# Patient Record
Sex: FEMALE
[De-identification: names, ages, dates, MRNs, and addresses within clinical notes are randomized per-mention and may not be internally consistent; named-entity substitution may affect disease eponyms.]

---
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
## 2017-01-24 NOTE — ED NOTES
Capri from [facility] at bedside for assessment. Pt remains calm and cooperative.

## 2017-01-24 NOTE — ED NOTES
Pt discharged to home with steady gait. No distress.  Pt states \"I have a car waiting for me from my daughter\"

## 2017-01-24 NOTE — ED NOTES
Cherelle Reyes (son) [phone]. He is the family member that brought in the patient, can call at any time, if you need to leave a message, he will call back ASAP.

## 2017-01-24 NOTE — ED NOTES
Pt safe for discharge per [facility]., Pt given phone to contact son for a ride home and all belongings. Pt calm and cooperative.
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
## 2017-01-24 NOTE — BH LEVEL OF CARE ASSESSMENT
Comprehensive Assessment Note  General Questions   Why are you here?: Pt stated that she has had some depression and anxiety. She reported she takes welbutrin for it which is prescribed by her PCP.  She said that recently her depression and anxiety have bee Describe Current/Recent Suicidal Ideation: Pt stated she has had passive, hopless thoughts but denies anything in the past couple of days at least. She stated that she made the statement today because she was frustrated, not because she was actually havi stated that it has been under control but did start getting a little worse again about 3 years ago when he son moved back in and she started drinking again. Anxiety Symptoms: Generalized;Panic attack; Chest discomfort;Palpitations; Shortness of breath   Pa hang-over?: No   Screening Score: 5   Tobacco Use: Never smoked/never used tobacco product   Caffeine (include beverages/tablets) Use: Yes   Amount/Frequency: Pt stated she does drink caffine   Last Use: Unknown   Used substances (other than as prescribed) Partner/Caregiver?: No   Health Concerns r/t Abuse: No   Possible Abuse Reportable to[de-identified] Not appropriate for reporting to authorities   Mental Status   Appearance Characteristics: Good hygiene; Appropriate clothing   Mood: Angry; Hopeless;Irritable   Affect:

## 2017-01-24 NOTE — ED PROVIDER NOTES
Patient re-evaluated at 0. She denies SI.HI. She was evaluated by [place] and they are comfortable with discharge.

## 2017-01-24 NOTE — ED PROVIDER NOTES
Patient Seen in: [facility] Emergency Department    History   Patient presents with:  Eval-P (psychiatric)    Stated Complaint: SI    HPI    this is a 57-year-old coming with complaints of alcohol abuse.   Patient has a history of intermittent alcoho Cancer Father      lung   • Other Father      COPD   • Diabetes Mother    • Heart Disorder Mother    • Psychiatric Mother      alzheimers   • Cancer Maternal Grandmother      colon CA   • Cancer Paternal Grandfather      colon CA   • Heart Disorder Materna tenderness noted  Extremity exam shows no clubbing cyanosis or edema  Skin exam shows no rashes or lacerations  Neuro exam shows no focal deficits  Back exam shows no tenderness    ED Course     Labs Reviewed   BASIC METABOLIC PANEL (8) - Abnormal; Notable

## 2017-08-30 PROBLEM — F41.9 ANXIETY: Status: ACTIVE | Noted: 2017-08-30

## 2017-08-30 PROBLEM — I34.1 MVP (MITRAL VALVE PROLAPSE): Status: ACTIVE | Noted: 2017-08-30

## 2017-11-06 PROBLEM — K76.0 FATTY LIVER: Status: ACTIVE | Noted: 2017-11-06

## 2017-11-07 PROBLEM — M25.561 ACUTE PAIN OF RIGHT KNEE: Status: ACTIVE | Noted: 2017-11-07

## 2017-11-07 PROBLEM — M25.562 LEFT KNEE PAIN: Status: ACTIVE | Noted: 2017-11-07

## 2017-11-07 PROCEDURE — 87338 HPYLORI STOOL AG IA: CPT | Performed by: INTERNAL MEDICINE

## 2017-11-13 PROBLEM — M25.562 LEFT KNEE PAIN: Status: RESOLVED | Noted: 2017-11-07 | Resolved: 2017-11-13

## 2018-10-05 ENCOUNTER — HOSPITAL (OUTPATIENT)
Dept: OTHER | Age: 58
End: 2018-10-05
Attending: EMERGENCY MEDICINE

## 2018-10-05 LAB
ANALYZER ANC (IANC): NORMAL
ANION GAP SERPL CALC-SCNC: 12 MMOL/L (ref 10–20)
BASOPHILS # BLD: 0 THOUSAND/MCL (ref 0–0.3)
BASOPHILS NFR BLD: 0 %
BUN SERPL-MCNC: 14 MG/DL (ref 6–20)
BUN/CREAT SERPL: 19 (ref 7–25)
CALCIUM SERPL-MCNC: 9.2 MG/DL (ref 8.4–10.2)
CHLORIDE: 105 MMOL/L (ref 98–107)
CO2 SERPL-SCNC: 30 MMOL/L (ref 21–32)
CREAT SERPL-MCNC: 0.75 MG/DL (ref 0.51–0.95)
DIFFERENTIAL METHOD BLD: NORMAL
EOSINOPHIL # BLD: 0.1 THOUSAND/MCL (ref 0.1–0.5)
EOSINOPHIL NFR BLD: 2 %
ERYTHROCYTE [DISTWIDTH] IN BLOOD: 13 % (ref 11–15)
GLUCOSE SERPL-MCNC: 97 MG/DL (ref 65–99)
HEMATOCRIT: 42 % (ref 36–46.5)
HGB BLD-MCNC: 14.1 GM/DL (ref 12–15.5)
LYMPHOCYTES # BLD: 1.5 THOUSAND/MCL (ref 1–4)
LYMPHOCYTES NFR BLD: 30 %
MCH RBC QN AUTO: 33.3 PG (ref 26–34)
MCHC RBC AUTO-ENTMCNC: 33.6 GM/DL (ref 32–36.5)
MCV RBC AUTO: 99.1 FL (ref 78–100)
MONOCYTES # BLD: 0.5 THOUSAND/MCL (ref 0.3–0.9)
MONOCYTES NFR BLD: 9 %
NEUTROPHILS # BLD: 3 THOUSAND/MCL (ref 1.8–7.7)
NEUTROPHILS NFR BLD: 59 %
NEUTS SEG NFR BLD: NORMAL %
NRBC (NRBCRE): NORMAL
PLATELET # BLD: 246 THOUSAND/MCL (ref 140–450)
POTASSIUM SERPL-SCNC: 3.6 MMOL/L (ref 3.4–5.1)
RBC # BLD: 4.24 MILLION/MCL (ref 4–5.2)
SODIUM SERPL-SCNC: 143 MMOL/L (ref 135–145)
WBC # BLD: 5.1 THOUSAND/MCL (ref 4.2–11)

## 2018-10-07 ENCOUNTER — HOSPITAL ENCOUNTER (EMERGENCY)
Facility: HOSPITAL | Age: 58
Discharge: HOME | End: 2018-10-07
Attending: EMERGENCY MEDICINE
Payer: COMMERCIAL

## 2018-10-07 VITALS
HEART RATE: 75 BPM | DIASTOLIC BLOOD PRESSURE: 74 MMHG | SYSTOLIC BLOOD PRESSURE: 118 MMHG | BODY MASS INDEX: 24.91 KG/M2 | OXYGEN SATURATION: 98 % | TEMPERATURE: 98 F | RESPIRATION RATE: 17 BRPM | WEIGHT: 155 LBS | HEIGHT: 66 IN

## 2018-10-07 DIAGNOSIS — G43.109 OCULAR MIGRAINE: Primary | ICD-10-CM

## 2018-10-07 PROCEDURE — 99283 EMERGENCY DEPT VISIT LOW MDM: CPT

## 2018-10-07 RX ORDER — SUMATRIPTAN 50 MG/1
50 TABLET, FILM COATED ORAL EVERY 2 HOUR PRN
Qty: 6 TABLET | Refills: 0 | Status: SHIPPED | OUTPATIENT
Start: 2018-10-07 | End: 2018-10-07

## 2018-10-07 RX ORDER — BUTALBITAL, ACETAMINOPHEN AND CAFFEINE 50; 325; 40 MG/1; MG/1; MG/1
1-2 TABLET ORAL EVERY 4 HOURS PRN
Qty: 10 TABLET | Refills: 0 | Status: SHIPPED | OUTPATIENT
Start: 2018-10-07 | End: 2018-10-14

## 2018-10-07 NOTE — ED NOTES
Faxed over release of medical information over to [facility] for CT scan results performed a few days ago.

## 2018-10-07 NOTE — ED INITIAL ASSESSMENT (HPI)
Patient see had headache four days went to PMD was sent to the [facility] performed CT scan which was normal. Instructed to follow up with neurologist this week.  Patient see yesterday was at work and had same occular migraine flashing over left eye, and

## 2018-10-07 NOTE — ED NOTES
Faxed over information request for a second time. [facility] nursing supervisor called back and didn't have dates requested.

## 2018-10-08 NOTE — ED PROVIDER NOTES
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   Patient states previously she  had unoff [address] [place] [address]  7/19/2012: COLONOSCOPY,DIAGNOSTIC      Comment:  Procedure: COLONOSCOPY, POSSIBLE BIOPSY, POSSIBLE                POLYPECTOMY 80507;  Surgeon: Paul Heaton MD;                 Location: [address] [place] [address] tenderness. ABDOMEN: + Bowel sounds, soft, nontender, nondistended. No rebound, no guarding, no hepatosplenomegaly. EXTREMITIES: Full range of motion, no tenderness, good capillary refill. SKIN: No rash, good turgor.   NEURO: Patient answers questions a

## 2018-11-02 NOTE — PROGRESS NOTES
HPI:   Patient presents with:  Establish Care  Lump: enlarged poss thyroid, h/o nodules  Anxiety      Linnell Severin is a 59year old female who presents for anxiety and depression    Mood is same .   She complains of:  feeling depression, anhedonia, anx • Panic attacks         Flagyl [Metronidazo*    DIARRHEA, NAUSEA AND VOMITING    Comment:GI INTOLERANCE  Tylox [Oxycodone-Ac*    RASH  Opioid Analgesics       NAUSEA AND VOMITING  Influenza Vaccines      OTHER (SEE COMMENTS)    Comment:Numbness extremiti associated skin changes or lesions  LUNGS: good BS B, clear to auscultation B, no wheezing, no rales, no rhonchi B   CARDIO: RRR without murmur, NL S1 S2, no S3 S4  EXT: no CCE, Brachial, PT pulses wnl B  GI: NABS  NEURO: A&O x 3, motor and sensory grossly Visit:  Requested Prescriptions      No prescriptions requested or ordered in this encounter       Imaging & Consults:  None    Marga Olson MD

## 2018-12-06 ENCOUNTER — TELEPHONE (OUTPATIENT)
Dept: FAMILY MEDICINE CLINIC | Facility: CLINIC | Age: 58
End: 2018-12-06

## 2018-12-06 ENCOUNTER — OFFICE VISIT (OUTPATIENT)
Dept: FAMILY MEDICINE CLINIC | Facility: CLINIC | Age: 58
End: 2018-12-06
Payer: COMMERCIAL

## 2018-12-06 VITALS
SYSTOLIC BLOOD PRESSURE: 112 MMHG | HEART RATE: 76 BPM | TEMPERATURE: 99 F | BODY MASS INDEX: 26.01 KG/M2 | WEIGHT: 158 LBS | HEIGHT: 65.5 IN | DIASTOLIC BLOOD PRESSURE: 80 MMHG | OXYGEN SATURATION: 97 % | RESPIRATION RATE: 16 BRPM

## 2018-12-06 DIAGNOSIS — R06.2 WHEEZING: ICD-10-CM

## 2018-12-06 DIAGNOSIS — R05.9 COUGH: ICD-10-CM

## 2018-12-06 DIAGNOSIS — J20.9 ACUTE BRONCHITIS, UNSPECIFIED ORGANISM: Primary | ICD-10-CM

## 2018-12-06 PROCEDURE — 94640 AIRWAY INHALATION TREATMENT: CPT | Performed by: FAMILY MEDICINE

## 2018-12-06 PROCEDURE — 99214 OFFICE O/P EST MOD 30 MIN: CPT | Performed by: FAMILY MEDICINE

## 2018-12-06 RX ORDER — AZITHROMYCIN 250 MG/1
TABLET, FILM COATED ORAL
Qty: 6 TABLET | Refills: 0 | Status: CANCELLED | OUTPATIENT
Start: 2018-12-06

## 2018-12-06 RX ORDER — ALBUTEROL SULFATE 2.5 MG/3ML
2.5 SOLUTION RESPIRATORY (INHALATION) ONCE
Status: COMPLETED | OUTPATIENT
Start: 2018-12-06 | End: 2018-12-06

## 2018-12-06 RX ORDER — BENZONATATE 100 MG/1
100 CAPSULE ORAL 3 TIMES DAILY PRN
Qty: 30 CAPSULE | Refills: 0 | Status: SHIPPED | OUTPATIENT
Start: 2018-12-06 | End: 2019-04-26 | Stop reason: ALTCHOICE

## 2018-12-06 RX ORDER — CODEINE PHOSPHATE AND GUAIFENESIN 10; 100 MG/5ML; MG/5ML
5 SOLUTION ORAL NIGHTLY PRN
Qty: 236 ML | Refills: 0 | Status: CANCELLED
Start: 2018-12-06

## 2018-12-06 RX ORDER — PREDNISONE 20 MG/1
20 TABLET ORAL SEE ADMIN INSTRUCTIONS
Qty: 18 TABLET | Refills: 0 | Status: SHIPPED | OUTPATIENT
Start: 2018-12-06 | End: 2019-04-26 | Stop reason: ALTCHOICE

## 2018-12-06 RX ORDER — ALBUTEROL SULFATE 90 UG/1
2 AEROSOL, METERED RESPIRATORY (INHALATION) EVERY 6 HOURS PRN
Qty: 1 INHALER | Refills: 2 | COMMUNITY
Start: 2018-12-06 | End: 2019-04-26 | Stop reason: ALTCHOICE

## 2018-12-06 RX ADMIN — ALBUTEROL SULFATE 2.5 MG: 2.5 SOLUTION RESPIRATORY (INHALATION) at 14:46:00

## 2018-12-06 NOTE — TELEPHONE ENCOUNTER
Pt cld she was in [address] [place] [address] yesterday in [facility] with high fever. They diagnosed her with flu. Today she has yellow coming out of her spetum(throat) so she feels she needs an antiobiotic. Told her I would send message along to nurse they would call h

## 2018-12-06 NOTE — PROGRESS NOTES
HPI: 59year old female presents for 1 day IC f/u on cough. VSS. Flu swab resulted negative per patient. Prescribed Tamiflu but didn't start it; has Alb INH from IC. Cough is dry. (+) assoc wheezing. Denies fevers/chills/sinus pressure/PND/CP/SOB/abd pain. edema.  Skin: Skin color, texture, turgor normal. No rashes or lesions. Lymph nodes: Cervical, supraclavicular nodes normal.    IMPRESSION:  1.  Acute Bronchitis    Patient Active Problem List:     Goiter     MVP (mitral valve prolapse)     Anxiety     Fat

## 2018-12-06 NOTE — TELEPHONE ENCOUNTER
Called patient; She states on Monday she started getting a sore throat, then on Wednesday morning started having fever/chills, clear sputum. Patient had fever yesterday evening, does not have a fever now.   Patient states her children had bronchitis recen

## 2018-12-06 NOTE — PATIENT INSTRUCTIONS
PLAN:  -Neb in the office with significant improvement  -Prednisone taper (no NSAIDs such as Ibuprofen/Advil/Aleve while taking)  -continue Albuterol INH every 4-6 hours as needed  -Tessalon as needed  -OTC Cepacol Cough drops as needed  -f/u as needed

## 2019-04-26 ENCOUNTER — HOSPITAL ENCOUNTER (OUTPATIENT)
Dept: BONE DENSITY | Age: 59
Discharge: HOME | End: 2019-04-26
Attending: FAMILY MEDICINE
Payer: COMMERCIAL

## 2019-04-26 ENCOUNTER — HOSPITAL ENCOUNTER (OUTPATIENT)
Dept: MAMMOGRAPHY | Age: 59
Discharge: HOME | End: 2019-04-26
Attending: FAMILY MEDICINE
Payer: COMMERCIAL

## 2019-04-26 ENCOUNTER — IMAGING SERVICES (OUTPATIENT)
Dept: OTHER | Age: 59
End: 2019-04-26
Attending: INTERNAL MEDICINE

## 2019-04-26 ENCOUNTER — HOSPITAL ENCOUNTER (OUTPATIENT)
Dept: GENERAL RADIOLOGY | Age: 59
Discharge: HOME | End: 2019-04-26
Attending: FAMILY MEDICINE
Payer: COMMERCIAL

## 2019-04-26 ENCOUNTER — OFFICE VISIT (OUTPATIENT)
Dept: FAMILY MEDICINE CLINIC | Facility: CLINIC | Age: 59
End: 2019-04-26
Payer: COMMERCIAL

## 2019-04-26 VITALS
SYSTOLIC BLOOD PRESSURE: 120 MMHG | TEMPERATURE: 98 F | DIASTOLIC BLOOD PRESSURE: 78 MMHG | RESPIRATION RATE: 17 BRPM | OXYGEN SATURATION: 98 % | BODY MASS INDEX: 25.88 KG/M2 | HEART RATE: 81 BPM | WEIGHT: 161 LBS | HEIGHT: 66 IN

## 2019-04-26 DIAGNOSIS — Z12.31 SCREENING MAMMOGRAM, ENCOUNTER FOR: ICD-10-CM

## 2019-04-26 DIAGNOSIS — F17.200 TOBACCO DEPENDENCE: ICD-10-CM

## 2019-04-26 DIAGNOSIS — R05.9 COUGH: ICD-10-CM

## 2019-04-26 DIAGNOSIS — F41.1 GENERALIZED ANXIETY DISORDER: ICD-10-CM
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
## 2019-04-26 DIAGNOSIS — Z12.11 SCREENING FOR COLON CANCER: ICD-10-CM

## 2019-04-26 DIAGNOSIS — Z13.820 SCREENING FOR OSTEOPOROSIS: ICD-10-CM

## 2019-04-26 DIAGNOSIS — F32.89 OTHER DEPRESSION: ICD-10-CM

## 2019-04-26 DIAGNOSIS — K76.0 FATTY LIVER: ICD-10-CM

## 2019-04-26 DIAGNOSIS — I83.893 VARICOSE VEINS OF BILATERAL LOWER EXTREMITIES WITH OTHER COMPLICATIONS: ICD-10-CM

## 2019-04-26 DIAGNOSIS — S96.911A: ICD-10-CM

## 2019-04-26 DIAGNOSIS — Z00.00 ROUTINE MEDICAL EXAM: Primary | ICD-10-CM

## 2019-04-26 PROBLEM — F32.A DEPRESSION: Status: ACTIVE | Noted: 2019-04-26

## 2019-04-26 PROCEDURE — 77063 BREAST TOMOSYNTHESIS BI: CPT | Performed by: FAMILY MEDICINE

## 2019-04-26 PROCEDURE — 77080 DXA BONE DENSITY AXIAL: CPT | Performed by: FAMILY MEDICINE

## 2019-04-26 PROCEDURE — 90471 IMMUNIZATION ADMIN: CPT | Performed by: FAMILY MEDICINE

## 2019-04-26 PROCEDURE — 99396 PREV VISIT EST AGE 40-64: CPT | Performed by: FAMILY MEDICINE

## 2019-04-26 PROCEDURE — 90715 TDAP VACCINE 7 YRS/> IM: CPT | Performed by: FAMILY MEDICINE

## 2019-04-26 PROCEDURE — 71046 X-RAY EXAM CHEST 2 VIEWS: CPT | Performed by: FAMILY MEDICINE

## 2019-04-26 PROCEDURE — 99213 OFFICE O/P EST LOW 20 MIN: CPT | Performed by: FAMILY MEDICINE

## 2019-04-26 PROCEDURE — 96127 BRIEF EMOTIONAL/BEHAV ASSMT: CPT | Performed by: FAMILY MEDICINE

## 2019-04-26 PROCEDURE — 77067 SCR MAMMO BI INCL CAD: CPT | Performed by: FAMILY MEDICINE

## 2019-04-26 RX ORDER — DULOXETIN HYDROCHLORIDE 30 MG/1
30 CAPSULE, DELAYED RELEASE ORAL DAILY
Qty: 30 CAPSULE | Refills: 1 | Status: SHIPPED | OUTPATIENT
Start: 2019-04-26 | End: 2019-06-04

## 2019-04-26 NOTE — H&P
HPI:   Patient presents with:  Physical  Anxiety  Depression  Musculoskeletal Problem  Nicotine Dependence      Hiro Mayer is a 59year old female who presents for a complete physical exam without pap/gyne exam.     Patient has new complaints of:  · left foot 3/8/2016   • Cerebral aneurysm without rupture 12/2010   • Cerebral aneurysm without rupture    • DEPRESSION    • Ectopic pregnancy x2    x2   • Mitral valve prolapse    • Panic attacks       Past Surgical History:   Procedure Laterality Date   • a week        Comment: recovering alcoholism, last drink 2007      Drug use: No      Sexual activity: Not Currently        Partners: Male    Other Topics      Concerns:         Service: No        Blood Transfusions: Yes          with ectopic 1981 discharge or retraction B, no skin lesions B, no axillary LAD B, no tenderness B  EXTREMITIES: no CCE,  brachial, DP, PT pulses wnl B  MS: no significant joint or bony deformities B UE and LE, FROM of B UE and LE and back  PSYCH: mood and affect WNL, speec (CYMBALTA) 30 MG Oral Cap DR Particles; Take 1 capsule (30 mg total) by mouth daily. Dispense: 30 capsule; Refill: 1    6. Fatty liver  ***  - COMP METABOLIC PANEL (14); Future    7.  Generalized anxiety disorder  ***  - DULoxetine HCl (CYMBALTA) 30 MG Ora

## 2019-04-26 NOTE — H&P
HPI:   Patient presents with:  Physical  Anxiety  Depression  Musculoskeletal Problem  Nicotine Dependence      Noel Zavala is a 59year old female who presents for a complete physical exam without pap/gyne exam.     Patient has new complaints of:  A  11/06/2017 02:32 PM    CO2 24.1 11/06/2017 02:32 PM    CREATSERUM 0.88 11/06/2017 02:32 PM    CA 9.8 11/06/2017 02:32 PM    ALB 4.2 11/06/2017 02:32 PM    TP 7.5 11/06/2017 02:32 PM    ALKPHO 78 11/06/2017 02:32 PM    AST 22 11/06/2017 02:32 PM CA   • Cancer Paternal Grandfather         colon CA   • Heart Disorder Maternal Grandfather         MI   • Cancer Sister         thyroid   • Cancer Brother         GB       Flagyl [Metronidazo*    DIARRHEA, NAUSEA AND VOMITING    Comment:GI INTOLERANCE  Ty Denies numbness or tingling or weakness  PSYCH:  No mood concerns, no chronic sleep difficulties    EXAM:   /78 (BP Location: Right arm)   Pulse 81   Temp 97.8 °F (36.6 °C) (Oral)   Resp 17   Ht 66\"   Wt 161 lb   SpO2 98%   BMI 25.99 kg/m²  Estimate exercise 30 minutes 3-4 times weekly. Health maintenance:   · PAP q 2-3 years if WNL through 66-72 y/o, pt declined  · Annual B screening Mammogram: due now   · Dexascan:  Will check now.   · Recommend dietary calcium and Vit D (for most women supplement (CPT=71046); Future    10. Varicose veins of bilateral lower extremities with other complications  rec sx txt, consider vascular surg consultation if sx worsen    11.  Repetitive strain injury of right ankle  rec PT eval and txt, f/u w me prn  - OP REFERRAL

## 2019-05-07 ENCOUNTER — OFFICE VISIT (OUTPATIENT)
Dept: FAMILY MEDICINE CLINIC | Facility: CLINIC | Age: 59
End: 2019-05-07
Payer: COMMERCIAL

## 2019-05-07 VITALS
HEIGHT: 66 IN | TEMPERATURE: 98 F | SYSTOLIC BLOOD PRESSURE: 120 MMHG | RESPIRATION RATE: 16 BRPM | BODY MASS INDEX: 26.2 KG/M2 | WEIGHT: 163 LBS | DIASTOLIC BLOOD PRESSURE: 80 MMHG | HEART RATE: 70 BPM

## 2019-05-07 DIAGNOSIS — R09.81 NASAL CONGESTION: ICD-10-CM

## 2019-05-07 DIAGNOSIS — R05.9 COUGH: Primary | ICD-10-CM

## 2019-05-07 PROCEDURE — 99214 OFFICE O/P EST MOD 30 MIN: CPT | Performed by: FAMILY MEDICINE

## 2019-05-07 RX ORDER — CEFUROXIME AXETIL 500 MG/1
500 TABLET ORAL 2 TIMES DAILY
Qty: 20 TABLET | Refills: 0 | Status: SHIPPED | OUTPATIENT
Start: 2019-05-07 | End: 2019-05-17

## 2019-05-07 NOTE — PATIENT INSTRUCTIONS
PLAN:  -Ceftin abx x 7 days  -stay well-hydrated  -steam showers or baths  -hot tea with honey  -OTC Robitussin Cough Syrup nightly as needed  -OTC Tylenol/Ibuprofen as needed  -note for work provided  -f/u as needed

## 2019-05-07 NOTE — PROGRESS NOTES
HPI: 59year old female presents c/o Patient presents with:  Cough: x4days    Nasal Congestion    VSS. Cough is productive-yellow. VSS. (+) assoc nasal congestion. . 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  Tolerating PO fluids/solids well without any di cyanosis or edema. Skin: Skin color, texture, turgor normal. No rashes or lesions. Lymph nodes: Cervical nodes normal.    Over 50% of this 25 minute visit was spent on counseling and coordination of care. IMPRESSION:  1.  Cough/Nasal Congestion - patie

## 2019-05-31 ENCOUNTER — LAB ENCOUNTER (OUTPATIENT)
Dept: LAB | Age: 59
End: 2019-05-31
Attending: FAMILY MEDICINE
Payer: COMMERCIAL

## 2019-05-31 DIAGNOSIS — K76.0 FATTY LIVER: ICD-10-CM

## 2019-05-31 DIAGNOSIS — L65.9 HAIR LOSS: ICD-10-CM

## 2019-05-31 DIAGNOSIS — Z00.00 ROUTINE MEDICAL EXAM: ICD-10-CM

## 2019-05-31 PROCEDURE — 36415 COLL VENOUS BLD VENIPUNCTURE: CPT

## 2019-05-31 PROCEDURE — 80053 COMPREHEN METABOLIC PANEL: CPT

## 2019-05-31 PROCEDURE — 85025 COMPLETE CBC W/AUTO DIFF WBC: CPT

## 2019-05-31 PROCEDURE — 83540 ASSAY OF IRON: CPT

## 2019-05-31 PROCEDURE — 83002 ASSAY OF GONADOTROPIN (LH): CPT

## 2019-05-31 PROCEDURE — 82306 VITAMIN D 25 HYDROXY: CPT

## 2019-05-31 PROCEDURE — 83001 ASSAY OF GONADOTROPIN (FSH): CPT

## 2019-05-31 PROCEDURE — 82728 ASSAY OF FERRITIN: CPT

## 2019-05-31 PROCEDURE — 84403 ASSAY OF TOTAL TESTOSTERONE: CPT

## 2019-05-31 PROCEDURE — 86038 ANTINUCLEAR ANTIBODIES: CPT

## 2019-05-31 PROCEDURE — 84480 ASSAY TRIIODOTHYRONINE (T3): CPT

## 2019-05-31 PROCEDURE — 80061 LIPID PANEL: CPT

## 2019-05-31 PROCEDURE — 84443 ASSAY THYROID STIM HORMONE: CPT

## 2019-06-04 NOTE — PROGRESS NOTES
HPI:   Patient presents with: Anxiety  Depression  Lab Results  Hemorrhoids  Héctor Devries is a 59year old female who presents for anxiety and depression    Mood is better somewhat.   She complains of:  feeling anxious, depressed mood and Smoking status: Former Smoker        Packs/day: 1.00        Years: 30.00        Pack years: 30        Types: Cigarettes        Quit date: 2019        Years since quittin.0      Smokeless tobacco: Never Used      Tobacco comment: 3-5 cig a day    Al well groomed and neatly dressed  Gait and Station:  normal  Attitude: cooperative and consistent  Speech: normal rate, rhythm and volume    ASSESSMENT AND PLAN:   1.  Patient is a 59year old female who is here for anxiety and depression, denies RANJITH     Pt Magdiel Rivas MD

## 2019-07-02 DIAGNOSIS — F32.89 OTHER DEPRESSION: ICD-10-CM

## 2019-07-02 DIAGNOSIS — F41.1 GENERALIZED ANXIETY DISORDER: ICD-10-CM

## 2019-07-02 RX ORDER — DULOXETIN HYDROCHLORIDE 30 MG/1
CAPSULE, DELAYED RELEASE ORAL
Qty: 30 CAPSULE | Refills: 0 | Status: SHIPPED | OUTPATIENT
Start: 2019-07-02 | End: 2019-09-27

## 2019-07-02 NOTE — TELEPHONE ENCOUNTER
A refill request was received for:  Requested Prescriptions     Pending Prescriptions Disp Refills   • DULOXETINE HCL 30 MG Oral Cap DR Particles [Pharmacy Med Name: DULoxetine 30MG     CAP] 30 capsule 1     Sig: TAKE 1 CAPSULE BY MOUTH ONCE DAILY     Last

## 2019-07-23 ENCOUNTER — HOSPITAL ENCOUNTER (OUTPATIENT)
Age: 59
Discharge: HOME | End: 2019-07-23
Attending: FAMILY MEDICINE
Payer: COMMERCIAL

## 2019-07-23 ENCOUNTER — OFFICE VISIT (OUTPATIENT)
Dept: FAMILY MEDICINE CLINIC | Facility: CLINIC | Age: 59
End: 2019-07-23
Payer: COMMERCIAL

## 2019-07-23 VITALS
HEIGHT: 66 IN | TEMPERATURE: 98 F | SYSTOLIC BLOOD PRESSURE: 108 MMHG | BODY MASS INDEX: 25.71 KG/M2 | DIASTOLIC BLOOD PRESSURE: 68 MMHG | OXYGEN SATURATION: 96 % | HEART RATE: 92 BPM | WEIGHT: 160 LBS | RESPIRATION RATE: 16 BRPM
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
## 2019-07-23 DIAGNOSIS — Z02.9 ENCOUNTERS FOR UNSPECIFIED ADMINISTRATIVE PURPOSE: Primary | ICD-10-CM

## 2019-07-23 DIAGNOSIS — S61.452A DOG BITE OF LEFT HAND, INITIAL ENCOUNTER: Primary | ICD-10-CM

## 2019-07-23 DIAGNOSIS — S61.452A DOG BITE OF LEFT HAND, INITIAL ENCOUNTER: ICD-10-CM
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
## 2019-07-23 PROCEDURE — 99213 OFFICE O/P EST LOW 20 MIN: CPT

## 2019-07-23 PROCEDURE — 99204 OFFICE O/P NEW MOD 45 MIN: CPT

## 2019-07-23 RX ORDER — IBUPROFEN 600 MG/1
600 TABLET ORAL EVERY 8 HOURS PRN
Qty: 30 TABLET | Refills: 0 | Status: SHIPPED | OUTPATIENT
Start: 2019-07-23 | End: 2019-08-02

## 2019-07-23 RX ORDER — AMOXICILLIN AND CLAVULANATE POTASSIUM 875; 125 MG/1; MG/1
1 TABLET, FILM COATED ORAL 2 TIMES DAILY
Qty: 10 TABLET | Refills: 0 | Status: SHIPPED | OUTPATIENT
Start: 2019-07-23 | End: 2019-07-28

## 2019-07-23 NOTE — ED INITIAL ASSESSMENT (HPI)
Pt states there her dog accidentally bit her left hand when they were playing. Pt is concerned that the area maybe infected now. Noted a small laceration to the top of her hand.

## 2019-07-23 NOTE — ED PROVIDER NOTES
Patient Seen in: [facility]    History   Patient presents with:  Bite (integumentary)    Stated Complaint: TL- dog bite/hand injury    HPI    23-year-old right-hand-dominant female with history of anxiety, MVP, and depressi reviewed. All other systems reviewed and negative except as noted above.     Physical Exam     ED Triage Vitals [07/23/19 1416]   /68   Pulse 92   Resp 16   Temp 97.9 °F (36.6 °C)   Temp src Temporal   SpO2 96 %   O2 Device None (Room air) needed for Pain or Fever.   Qty: 30 tablet Refills: 0

## 2019-07-23 NOTE — PROGRESS NOTES
Mathew Hoyos is a 59year old female who presents to [facility] with c/o dog bite to left hand. The incident occurred 4 days ago. This was her own dog, a pit bull, and reports it was very seemingly accidental as they were playing indoors.   Reports dog is UTD

## 2019-09-27 ENCOUNTER — OFFICE VISIT (OUTPATIENT)
Dept: FAMILY MEDICINE CLINIC | Facility: CLINIC | Age: 59
End: 2019-09-27
Payer: COMMERCIAL

## 2019-09-27 VITALS
SYSTOLIC BLOOD PRESSURE: 122 MMHG | DIASTOLIC BLOOD PRESSURE: 80 MMHG | WEIGHT: 164 LBS | TEMPERATURE: 98 F | OXYGEN SATURATION: 98 % | BODY MASS INDEX: 26.36 KG/M2 | HEIGHT: 66 IN | HEART RATE: 76 BPM

## 2019-09-27 DIAGNOSIS — B34.8 INFECTION DUE TO HUMAN PARAINFLUENZA VIRUS 2: ICD-10-CM

## 2019-09-27 DIAGNOSIS — J06.9 UPPER RESPIRATORY TRACT INFECTION, UNSPECIFIED TYPE: Primary | ICD-10-CM

## 2019-09-27 PROCEDURE — 99213 OFFICE O/P EST LOW 20 MIN: CPT | Performed by: FAMILY MEDICINE

## 2019-09-27 NOTE — PROGRESS NOTES
HPI:  Patient presents with:  URI: x4 days - no fever - mild congestion - loose cough  Note: Needs note to return back to work      Yaima De La Cruz is a 59year old female who presents for: upper respiratory symptoms for  4 days.     Patient reports  cough, Quit date: 2019        Years since quittin.3      Smokeless tobacco: Never Used      Tobacco comment: 3-5 cig a day    Alcohol use:  Yes      Alcohol/week: 0.0 standard drinks      Frequency: 2-3 times a week      Comment: recovering alcoholism, las NSAIDs PRN. · Prescriptions given today:none  · Will observe for now  · Follow up as needed. · Call if no improvement or worsening in next week or sooner if SOB/RD. Additional Assessment and Plan:  1.  Upper respiratory tract infection, unspecified typ

## 2019-11-06 DIAGNOSIS — F32.89 OTHER DEPRESSION: ICD-10-CM

## 2019-11-06 DIAGNOSIS — F41.1 GENERALIZED ANXIETY DISORDER: ICD-10-CM

## 2019-11-07 RX ORDER — DULOXETIN HYDROCHLORIDE 60 MG/1
CAPSULE, DELAYED RELEASE ORAL
Qty: 90 CAPSULE | Refills: 0 | Status: SHIPPED | OUTPATIENT
Start: 2019-11-07 | End: 2020-06-15

## 2019-11-07 NOTE — TELEPHONE ENCOUNTER
Call pt-LHK I sent in RF(s) and remind that she is due for follow up with me:    now  I am booking out 4-6 weeks so make appointment now if able. Thanks!

## 2020-03-20 ENCOUNTER — LAB ENCOUNTER (OUTPATIENT)
Dept: LAB | Facility: HOSPITAL | Age: 60
End: 2020-03-20
Attending: FAMILY MEDICINE
Payer: COMMERCIAL

## 2020-03-20 ENCOUNTER — HOSPITAL ENCOUNTER (OUTPATIENT)
Age: 60
Discharge: HOME | End: 2020-03-20
Attending: EMERGENCY MEDICINE
Payer: COMMERCIAL

## 2020-03-20 ENCOUNTER — TELEPHONE (OUTPATIENT)
Dept: FAMILY MEDICINE CLINIC | Facility: CLINIC | Age: 60
End: 2020-03-20

## 2020-03-20 VITALS
RESPIRATION RATE: 18 BRPM | WEIGHT: 160 LBS | BODY MASS INDEX: 25.71 KG/M2 | HEART RATE: 74 BPM | OXYGEN SATURATION: 98 % | HEIGHT: 66 IN | TEMPERATURE: 98 F | DIASTOLIC BLOOD PRESSURE: 81 MMHG | SYSTOLIC BLOOD PRESSURE: 126 MMHG

## 2020-03-20 DIAGNOSIS — R50.9 FEVER, UNSPECIFIED FEVER CAUSE: ICD-10-CM

## 2020-03-20 DIAGNOSIS — R05.9 COUGH: ICD-10-CM

## 2020-03-20 DIAGNOSIS — J06.9 UPPER RESPIRATORY TRACT INFECTION, UNSPECIFIED TYPE: ICD-10-CM

## 2020-03-20 DIAGNOSIS — J01.90 ACUTE SINUSITIS, RECURRENCE NOT SPECIFIED, UNSPECIFIED LOCATION: ICD-10-CM

## 2020-03-20 DIAGNOSIS — J01.10 ACUTE FRONTAL SINUSITIS, RECURRENCE NOT SPECIFIED: Primary | ICD-10-CM

## 2020-03-20 DIAGNOSIS — R05.9 COUGH: Primary | ICD-10-CM

## 2020-03-20 PROCEDURE — 99442 PHONE E/M BY PHYS 11-20 MIN: CPT | Performed by: FAMILY MEDICINE

## 2020-03-20 PROCEDURE — 87633 RESP VIRUS 12-25 TARGETS: CPT

## 2020-03-20 PROCEDURE — 99214 OFFICE O/P EST MOD 30 MIN: CPT

## 2020-03-20 PROCEDURE — 99213 OFFICE O/P EST LOW 20 MIN: CPT

## 2020-03-20 PROCEDURE — 87798 DETECT AGENT NOS DNA AMP: CPT

## 2020-03-20 PROCEDURE — 87581 M.PNEUMON DNA AMP PROBE: CPT

## 2020-03-20 PROCEDURE — 87486 CHLMYD PNEUM DNA AMP PROBE: CPT

## 2020-03-20 RX ORDER — AMOXICILLIN AND CLAVULANATE POTASSIUM 875; 125 MG/1; MG/1
1 TABLET, FILM COATED ORAL 2 TIMES DAILY
Qty: 20 TABLET | Refills: 0 | Status: SHIPPED | OUTPATIENT
Start: 2020-03-20 | End: 2020-03-30

## 2020-03-20 NOTE — TELEPHONE ENCOUNTER
Virtual/Telephone Check-In    Steffany PROCTOR Chance verbally consents to a Virtual/Telephone Check-In service on 03/20/20. Patient understands and accepts financial responsibility for any deductible, co-insurance and/or co-pays associated with this service. above 100.4.  Notify office/physician/NP on call if fever or symptoms are worsening  · Prescriptions given today: None  · Call and or go to ER if no improvement or worsening symptoms including, but not limited to, respiratory distress/worsening fever and co

## 2020-03-20 NOTE — TELEPHONE ENCOUNTER
Patient called in for medical clearance to return back to work. She said she wasn't feeling well and had some questions as well.

## 2020-03-20 NOTE — ED INITIAL ASSESSMENT (HPI)
Dr. Jose Lewis the patient. Patient states she has had a wet cough that has been getting worse x 3 weeks. 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   Patient states that [place]

## 2020-03-20 NOTE — ED PROVIDER NOTES
Patient Seen in: [facility] [facility]      History   Patient presents with:  Cough/URI    Stated Complaint: sneezing, runny nose, cough x3 weeks     HPI    13-year-old female comes to the hospital cough as well as a runny nose and si last drink 2007    Drug use: No             Review of Systems    Positive for stated complaint: sneezing, runny nose, cough x3 weeks   Other systems are as noted in HPI. Constitutional and vital signs reviewed.       All other systems reviewed and negative medications    Amoxicillin-Pot Clavulanate 875-125 MG Oral Tab  Take 1 tablet by mouth 2 (two) times daily for 10 days.   Qty: 20 tablet Refills: 0

## 2020-03-21 LAB
ADENOVIRUS PCR:: NEGATIVE
B PERT DNA SPEC QL NAA+PROBE: NEGATIVE
C PNEUM DNA SPEC QL NAA+PROBE: NEGATIVE
CORONAVIRUS 229E PCR:: NEGATIVE
CORONAVIRUS HKU1 PCR:: NEGATIVE
CORONAVIRUS NL63 PCR:: NEGATIVE
CORONAVIRUS OC43 PCR:: NEGATIVE
FLUAV RNA SPEC QL NAA+PROBE: NEGATIVE
FLUBV RNA SPEC QL NAA+PROBE: NEGATIVE
METAPNEUMOVIRUS PCR:: NEGATIVE
MYCOPLASMA PNEUMONIA PCR:: NEGATIVE
PARAINFLUENZA 1 PCR:: NEGATIVE
PARAINFLUENZA 2 PCR:: NEGATIVE
PARAINFLUENZA 3 PCR:: NEGATIVE
PARAINFLUENZA 4 PCR:: NEGATIVE
RHINOVIRUS/ENTERO PCR:: NEGATIVE
RSV RNA SPEC QL NAA+PROBE: NEGATIVE

## 2020-03-22 LAB — SARS-COV-2 RNA RESP QL NAA+PROBE: NOT DETECTED

## 2020-03-23 ENCOUNTER — PATIENT MESSAGE (OUTPATIENT)
Dept: FAMILY MEDICINE CLINIC | Facility: CLINIC | Age: 60
End: 2020-03-23

## 2020-03-23 ENCOUNTER — TELEPHONE (OUTPATIENT)
Dept: FAMILY MEDICINE CLINIC | Facility: CLINIC | Age: 60
End: 2020-03-23

## 2020-03-23 NOTE — TELEPHONE ENCOUNTER
Return to work note sent to [employer]. Her respiratory and COVID-19 testing was all negative. Patient reports symptoms improving; she is on antibiotics for a sinus infection.

## 2020-04-09 ENCOUNTER — VIRTUAL PHONE E/M (OUTPATIENT)
Dept: FAMILY MEDICINE CLINIC | Facility: CLINIC | Age: 60
End: 2020-04-09
Payer: COMMERCIAL

## 2020-04-09 DIAGNOSIS — R09.82 POST-NASAL DRIP: ICD-10-CM

## 2020-04-09 DIAGNOSIS — R05.9 COUGH: ICD-10-CM

## 2020-04-09 DIAGNOSIS — U07.1 COVID-19 VIRUS DETECTED: Primary | ICD-10-CM

## 2020-04-09 DIAGNOSIS — F17.200 SMOKER: ICD-10-CM

## 2020-04-09 PROCEDURE — 99213 OFFICE O/P EST LOW 20 MIN: CPT | Performed by: NURSE PRACTITIONER

## 2020-04-09 RX ORDER — ALBUTEROL SULFATE 90 UG/1
2 AEROSOL, METERED RESPIRATORY (INHALATION) EVERY 6 HOURS PRN
Qty: 1 INHALER | Refills: 0 | Status: SHIPPED | OUTPATIENT
Start: 2020-04-09 | End: 2020-06-15

## 2020-04-09 NOTE — PROGRESS NOTES
Due to the real risk of possible exposure to Coronavirus (CoV-2, COVID-19) in the office/medical building and recommendations for social distancing (key to mitigation/limiting the spread of the virus) a Virtual or Telemedicine visit over the phone was perf HPI.  CARDIOVASCULAR: Denies chest pain, shortness of breath, palpitations, edema. RESPIRATORY: Denies shortness of breath. Admits to mild, productive cough associated with post-nasal drip, see HPI. Cough has been present since sinusitis dx, no worse.   GI hysterectomy, has one ovary she doesn't remeber which   •      •      •      • OTHER SURGICAL HISTORY  2010    L cerebral aneurysmal repair, at [facility]:   1.  Patient is a 62year old female who calls for: PRASHANT and/or go to the ER if worsening symptoms including, but not limited to: respiratory distress, shortness of breath and  wheezing, worsening fever, cough and mental status.       The patient (or patient's parent if <19 y/o) indicates understanding of the abo

## 2020-04-13 ENCOUNTER — TELEPHONE (OUTPATIENT)
Dept: FAMILY MEDICINE CLINIC | Facility: CLINIC | Age: 60
End: 2020-04-13

## 2020-04-13 NOTE — TELEPHONE ENCOUNTER
Called patient for COVID+ follow-up. 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 She had not had any symptoms last week.     Patient felt short of breath last night while mopping, took

## 2020-04-15 ENCOUNTER — TELEPHONE (OUTPATIENT)
Dept: FAMILY MEDICINE CLINIC | Facility: CLINIC | Age: 60
End: 2020-04-15

## 2020-04-16 ENCOUNTER — TELEPHONE (OUTPATIENT)
Dept: FAMILY MEDICINE CLINIC | Facility: CLINIC | Age: 60
End: 2020-04-16

## 2020-04-16 NOTE — TELEPHONE ENCOUNTER
Spoke with patient for COVID+ follow-up. She is feeling well. Reports mild fatigue, a very mild dry cough. Denies fever, chills, shortness of breath, headaches, dizziness, n/v/d/c, chest pain. Eating and drinking normally.  She reports she was tested for CO

## 2020-04-20 ENCOUNTER — TELEPHONE (OUTPATIENT)
Dept: FAMILY MEDICINE CLINIC | Facility: CLINIC | Age: 60
End: 2020-04-20

## 2020-04-20 NOTE — TELEPHONE ENCOUNTER
Spoke with patient. She is free of COVID symptoms. She feels well, she is awaiting the results of her repeat COVID testing through her work to find out if she can return to work this week.  She will message me via [employer] to notify of her results once she h

## 2020-06-08 RX ORDER — AMOXICILLIN AND CLAVULANATE POTASSIUM 875; 125 MG/1; MG/1
1 TABLET, FILM COATED ORAL 2 TIMES DAILY
Qty: 20 TABLET | Refills: 1 | Status: CANCELLED | OUTPATIENT
Start: 2020-06-09

## 2020-06-08 RX ORDER — AZITHROMYCIN 500 MG/1
500 TABLET, FILM COATED ORAL DAILY
Qty: 5 TABLET | Refills: 0 | Status: CANCELLED | OUTPATIENT
Start: 2020-06-09 | End: 2020-06-14

## 2020-06-09 ENCOUNTER — TELEPHONE (OUTPATIENT)
Dept: FAMILY MEDICINE CLINIC | Facility: CLINIC | Age: 60
End: 2020-06-09

## 2020-06-09 ENCOUNTER — VIRTUAL PHONE E/M (OUTPATIENT)
Dept: FAMILY MEDICINE CLINIC | Facility: CLINIC | Age: 60
End: 2020-06-09
Payer: COMMERCIAL

## 2020-06-09 DIAGNOSIS — Z13.820 SCREENING FOR OSTEOPOROSIS: ICD-10-CM

## 2020-06-09 DIAGNOSIS — M85.80 OSTEOPENIA, UNSPECIFIED LOCATION: ICD-10-CM

## 2020-06-09 DIAGNOSIS — Z00.00 ROUTINE MEDICAL EXAM: ICD-10-CM

## 2020-06-09 DIAGNOSIS — J20.9 BRONCHITIS WITH BRONCHOSPASM: Primary | ICD-10-CM

## 2020-06-09 DIAGNOSIS — Z12.31 BREAST CANCER SCREENING BY MAMMOGRAM: ICD-10-CM

## 2020-06-09 DIAGNOSIS — J06.9 UPPER RESPIRATORY TRACT INFECTION, UNSPECIFIED TYPE: ICD-10-CM

## 2020-06-09 PROCEDURE — 99441 PHONE E/M BY PHYS 5-10 MIN: CPT | Performed by: FAMILY MEDICINE

## 2020-06-09 RX ORDER — CEFDINIR 300 MG/1
300 CAPSULE ORAL 2 TIMES DAILY
Qty: 20 CAPSULE | Refills: 1 | Status: SHIPPED | OUTPATIENT
Start: 2020-06-09 | End: 2020-06-19

## 2020-06-09 NOTE — TELEPHONE ENCOUNTER
I spoke with patient today at her virtual office visit, she thought her 4:30 appointment on 6/16 was a physical exam in person; please change to an in person physical exam on this date, if you are unable to please contact patient and reschedule, thanks

## 2020-06-09 NOTE — PROGRESS NOTES
Virtual Telephone Check-In    Janelle Chase verbally consents to a Virtual/Telephone Check-In visit on 06/09/20. Patient has been referred to the [facility] website at [email or web address] to review the yearly Consent to Treat document.     Patient underst • ClonazePAM (KLONOPIN) 0.5 MG Oral Tab Take 0.5 mg by mouth as needed.        Past Medical History:   Diagnosis Date   • Alcoholism ([facility] [address]    • Capsulitis of left foot 3/8/2016   • Cerebral aneurysm without rupture 12/2010   • Cerebral aneurysm without ru Future  - LIPID PANEL; Future  - VITAMIN D, 25-HYDROXY; Future  - TSH W REFLEX TO FREE T4; Future    4. Breast cancer screening by mammogram  - [facility] SKIP 2D+3D SCREENING BILAT (CPT=77067/79481); Future    5.  Osteopenia, unspecified location  - VITAMIN D, 25

## 2020-06-15 ENCOUNTER — OFFICE VISIT (OUTPATIENT)
Dept: FAMILY MEDICINE CLINIC | Facility: CLINIC | Age: 60
End: 2020-06-15
Payer: COMMERCIAL

## 2020-06-15 ENCOUNTER — LAB ENCOUNTER (OUTPATIENT)
Dept: LAB | Facility: REFERENCE LAB | Age: 60
End: 2020-06-15
Attending: FAMILY MEDICINE
Payer: COMMERCIAL

## 2020-06-15 ENCOUNTER — HOSPITAL ENCOUNTER (OUTPATIENT)
Dept: GENERAL RADIOLOGY | Facility: HOSPITAL | Age: 60
Discharge: HOME | End: 2020-06-15
Attending: NURSE PRACTITIONER
Payer: COMMERCIAL

## 2020-06-15 VITALS
DIASTOLIC BLOOD PRESSURE: 60 MMHG | TEMPERATURE: 99 F | HEART RATE: 92 BPM | RESPIRATION RATE: 16 BRPM | SYSTOLIC BLOOD PRESSURE: 92 MMHG | OXYGEN SATURATION: 97 %

## 2020-06-15 DIAGNOSIS — M85.80 OSTEOPENIA, UNSPECIFIED LOCATION: ICD-10-CM

## 2020-06-15 DIAGNOSIS — R50.9 FEVER, UNSPECIFIED FEVER CAUSE: ICD-10-CM

## 2020-06-15 DIAGNOSIS — R09.81 SINUS CONGESTION: ICD-10-CM

## 2020-06-15 DIAGNOSIS — R06.2 WHEEZING: ICD-10-CM

## 2020-06-15 DIAGNOSIS — R50.9 FEVER, UNSPECIFIED FEVER CAUSE: Primary | ICD-10-CM

## 2020-06-15 DIAGNOSIS — Z00.00 ROUTINE MEDICAL EXAM: ICD-10-CM

## 2020-06-15 DIAGNOSIS — R06.02 SHORTNESS OF BREATH: ICD-10-CM

## 2020-06-15 DIAGNOSIS — Z20.822 COVID-19 VIRUS NOT DETECTED: ICD-10-CM

## 2020-06-15 DIAGNOSIS — R05.9 COUGH: ICD-10-CM

## 2020-06-15 PROCEDURE — 99213 OFFICE O/P EST LOW 20 MIN: CPT | Performed by: NURSE PRACTITIONER

## 2020-06-15 PROCEDURE — 82306 VITAMIN D 25 HYDROXY: CPT

## 2020-06-15 PROCEDURE — 71046 X-RAY EXAM CHEST 2 VIEWS: CPT | Performed by: NURSE PRACTITIONER

## 2020-06-15 PROCEDURE — 84439 ASSAY OF FREE THYROXINE: CPT

## 2020-06-15 PROCEDURE — 84443 ASSAY THYROID STIM HORMONE: CPT

## 2020-06-15 PROCEDURE — 84481 FREE ASSAY (FT-3): CPT

## 2020-06-15 PROCEDURE — 80061 LIPID PANEL: CPT

## 2020-06-15 PROCEDURE — 36415 COLL VENOUS BLD VENIPUNCTURE: CPT

## 2020-06-15 PROCEDURE — 80053 COMPREHEN METABOLIC PANEL: CPT

## 2020-06-15 PROCEDURE — 85025 COMPLETE CBC W/AUTO DIFF WBC: CPT

## 2020-06-15 RX ORDER — METHYLPREDNISOLONE 4 MG/1
TABLET ORAL
Qty: 1 KIT | Refills: 0 | Status: SHIPPED | OUTPATIENT
Start: 2020-06-15 | End: 2020-06-29 | Stop reason: ALTCHOICE

## 2020-06-15 RX ORDER — SACCHAROMYCES BOULARDII 250 MG
250 CAPSULE ORAL 2 TIMES DAILY
Qty: 60 CAPSULE | Refills: 1 | Status: SHIPPED | OUTPATIENT
Start: 2020-06-15 | End: 2020-06-29 | Stop reason: ALTCHOICE

## 2020-06-15 RX ORDER — ALBUTEROL SULFATE 90 UG/1
2 AEROSOL, METERED RESPIRATORY (INHALATION) EVERY 6 HOURS PRN
Qty: 1 INHALER | Refills: 0 | Status: SHIPPED | OUTPATIENT
Start: 2020-06-15 | End: 2021-03-03

## 2020-06-15 RX ORDER — AZITHROMYCIN 250 MG/1
TABLET, FILM COATED ORAL
Qty: 6 TABLET | Refills: 0 | Status: SHIPPED | OUTPATIENT
Start: 2020-06-15 | End: 2020-06-20

## 2020-06-15 NOTE — PATIENT INSTRUCTIONS
-Tylenol every 6-8 hours as needed, not to exceed 3000mg per 24 hour period.  -Sudafed (or generic) as needed over-the-counter every 6-8 hours for sinus pressure  -Humidifier in bedroom  -Flonase every night, think \"nose-to-toes\"  -Saline nasal spray a · You may use over-the-counter medicine to control fever or pain, unless another medicine was prescribed.  Note: If you have chronic liver or kidney disease or have ever had a stomach ulcer or gastrointestinal bleeding, talk with your healthcare provider be · Coughing up increasing amounts of colored sputum  · Weakness, drowsiness, headache, facial pain, ear pain, or a stiff neck  Call 911  Call 911 if any of these occur.   · Coughing up blood  · Worsening weakness, drowsiness, headache, or stiff neck  · Incre

## 2020-06-15 NOTE — PROGRESS NOTES
Chief Complaint:   Patient presents with:  Acute: Continue cough, fever, wheezing      HPI:   This is a 62year old female coming in for continued fever, cough, shortness of breath, wheezing, and sinus pressure.  She has had symptoms for about 3 weeks with Negative Negative    Coronavirus Oc43 PCR: Negative Negative    Metapneumovirus PCR: Negative Negative    Rhinovirus/Entero PCR: Negative Negative    Influenza A PCR: Negative Negative    Influenza B PCR: Negative Negative    Parainfluenza 1 PCR: Negative History:  Family History   Problem Relation Age of Onset   • Cancer Father         lung   • Other (Other) Father         COPD   • Diabetes Mother    • Heart Disorder Mother    • Psychiatric Mother         alzheimers   • Cancer Maternal Grandmother rhinorrhea, sinus pressure in forehead. INTEGUMENTARY:  Denies rashes, itching, skin lesion. CARDIOVASCULAR:  Denies chest pain, palpitations, edema.   RESPIRATORY:  Positive for wheezing, cough with neon green sputum, intermittent dyspnea on exertion and turgor. HEART:  Regular rate and rhythm, no murmurs, rubs or gallops. LUNGS: Expiratory wheezing present in all lung fields. Rhonchi present throughout, cleared somewhat by coughing. No tachypnea or accessory muscle use.   CHEST: No tenderness to palpatio tablet; Refill: 0  - Albuterol Sulfate  (90 Base) MCG/ACT Inhalation Aero Soln; Inhale 2 puffs into the lungs every 6 (six) hours as needed for Shortness of Breath. Dispense: 1 Inhaler;  Refill: 0  - methylPREDNISolone (MEDROL) 4 MG Oral Tablet Ther kit; Refill: 0    5. Sinus congestion  -Add Sudafed OTC PRN. See above. 6. COVID-19 virus not detected  -COVID negative x 3 as above.        Meds & Refills for this Visit:  Requested Prescriptions     Signed Prescriptions Disp Refills   • azithromycin 25

## 2020-06-18 ENCOUNTER — TELEPHONE (OUTPATIENT)
Dept: FAMILY MEDICINE CLINIC | Facility: CLINIC | Age: 60
End: 2020-06-18

## 2020-06-18 NOTE — TELEPHONE ENCOUNTER
Patient reports she has not had any fevers since office visit on Monday morning. Shortness of breath much-improved, has only needed albuterol inhaler at night instead of throughout the day. Cough improving, sputum color improving.  Sinus congestion improved

## 2020-06-18 NOTE — TELEPHONE ENCOUNTER
Spoke to pt and rescheduled physical due to provider schedule change. Pt is needing a note to return to work prior to her new scheduled appt.

## 2020-06-21 DIAGNOSIS — D72.825 BANDEMIA: ICD-10-CM

## 2020-06-21 DIAGNOSIS — R79.89 LOW TSH LEVEL: Primary | ICD-10-CM

## 2020-06-26 PROBLEM — IMO0002 BODY MASS INDEX (BMI) OF 25.0 TO 29.9: Status: ACTIVE | Noted: 2019-07-15

## 2020-06-26 PROBLEM — R03.0 ELEVATED BLOOD-PRESSURE READING, WITHOUT DIAGNOSIS OF HYPERTENSION: Status: ACTIVE | Noted: 2019-07-15

## 2020-06-26 PROBLEM — H52.229 REGULAR ASTIGMATISM: Status: ACTIVE | Noted: 2019-07-15

## 2020-06-26 PROBLEM — H52.4 PRESBYOPIA: Status: ACTIVE | Noted: 2019-07-15

## 2020-06-26 PROBLEM — H52.10 MYOPIA: Status: ACTIVE | Noted: 2019-07-15

## 2020-06-26 PROBLEM — H52.13 MYOPIA, BILATERAL: Status: ACTIVE | Noted: 2019-07-15

## 2020-06-29 ENCOUNTER — OFFICE VISIT (OUTPATIENT)
Dept: FAMILY MEDICINE CLINIC | Facility: CLINIC | Age: 60
End: 2020-06-29
Payer: COMMERCIAL

## 2020-06-29 VITALS
DIASTOLIC BLOOD PRESSURE: 70 MMHG | BODY MASS INDEX: 25.71 KG/M2 | OXYGEN SATURATION: 96 % | HEART RATE: 81 BPM | RESPIRATION RATE: 16 BRPM | SYSTOLIC BLOOD PRESSURE: 112 MMHG | HEIGHT: 66 IN | WEIGHT: 160 LBS | TEMPERATURE: 98 F

## 2020-06-29 DIAGNOSIS — F41.1 GENERALIZED ANXIETY DISORDER: ICD-10-CM

## 2020-06-29 DIAGNOSIS — Z00.00 ADULT GENERAL MEDICAL EXAMINATION: Primary | ICD-10-CM

## 2020-06-29 DIAGNOSIS — E55.9 VITAMIN D DEFICIENCY: ICD-10-CM

## 2020-06-29 DIAGNOSIS — N39.46 MIXED STRESS AND URGE URINARY INCONTINENCE: ICD-10-CM

## 2020-06-29 DIAGNOSIS — F33.0 MILD EPISODE OF RECURRENT MAJOR DEPRESSIVE DISORDER (HCC): ICD-10-CM

## 2020-06-29 DIAGNOSIS — R05.9 COUGH: ICD-10-CM

## 2020-06-29 DIAGNOSIS — R09.81 SINUS CONGESTION: ICD-10-CM

## 2020-06-29 DIAGNOSIS — I34.1 MVP (MITRAL VALVE PROLAPSE): ICD-10-CM

## 2020-06-29 DIAGNOSIS — R94.6 ABNORMAL THYROID FUNCTION TEST: ICD-10-CM

## 2020-06-29 DIAGNOSIS — F17.200 TOBACCO DEPENDENCE: ICD-10-CM

## 2020-06-29 DIAGNOSIS — D72.829 LEUKOCYTOSIS, UNSPECIFIED TYPE: ICD-10-CM

## 2020-06-29 PROCEDURE — 99396 PREV VISIT EST AGE 40-64: CPT | Performed by: NURSE PRACTITIONER

## 2020-06-29 PROCEDURE — 99213 OFFICE O/P EST LOW 20 MIN: CPT | Performed by: NURSE PRACTITIONER

## 2020-06-29 RX ORDER — CHOLECALCIFEROL (VITAMIN D3) 50 MCG
1 TABLET ORAL
COMMUNITY

## 2020-06-29 RX ORDER — BENZONATATE 100 MG/1
100 CAPSULE ORAL 3 TIMES DAILY PRN
Qty: 30 CAPSULE | Refills: 0 | Status: SHIPPED | OUTPATIENT
Start: 2020-06-29 | End: 2020-07-09

## 2020-06-29 NOTE — PATIENT INSTRUCTIONS
-Start a daily antihistamine over-the-counter (Claritin or Zyrtec, generic is OK) for at least 2 weeks, longer if needed.  -Flonase twice a day for 3 days, then once daily. Think \"nose-to-toes. \" Rinse mouth after each use.   -Benzonatate (prescription) ev A cough that is worse at night may be due to postnasal drip. Excess mucus in the nose drains from the back of your nose to your throat. This triggers the cough reflex. Postnasal drip may be due to a sinus infection or allergy.  Common allergens include dust The esophagus is the tube that carries food from the mouth to the stomach. A valve at its lower end prevents stomach acids from flowing upward. If this valve does not work properly, acid from the stomach enters the esophagus.  This may cause a burning pain © 4957-5300 [employer] [address]. [facility], [address] [place]. All rights reserved. This information is not intended as a substitute for professional medical care. Always follow your healthcare professional's instructions.         Adult I · [facility] men who have sex with men, including young men who identify as lopez or bisexual or who intend to have sex with men through age 32  · Transgender young adults through age 32      Ask your healthcare provider if this vaccine is right for you.     Pneum Mumps, a disease that causes swelling in the salivary glands. It may affect the ovaries or testes. Rubella ([place] measles). This is a form of measles that can cause birth defects if a pregnant woman catches it.   Adults born in [birth year] or later who are not Based on the [employer] recommendations for adults   [employer] last reviewed this educational content on 6/1/2019  © 7934-2496 [employer] [address]. [address] [facility], [address] [place] [address]. All rights reserved.  This information · Ease into your routine. Set small goals. Then build on them. If you are not sure what your activity level should be, talk with your healthcare provider first before starting an exercise routine. · Exercise on most days.  Aim for a total of 150 minutes (2 Andreina last reviewed this educational content on 7/1/2019  © 8909-8039 [employer] [address]. [address] [facility], [facility] [address] [place]. All rights reserved. This information is not intended as a substitute for professional medical care.  Always follo

## 2020-06-29 NOTE — PROGRESS NOTES
Chief Complaint:   Patient presents with:  Physical: Annual physical exam      HPI:   This is a 62year old female coming in for complete physical exam without pap/gyne.  Patient reports a history of a partial hysterectomy in 1993, states one ovary remains >10 years ago, asymptomatic, states she was told by cardiology that she does not need to follow-up unless she experiences symptoms.     -Elevated WBC: Likely due to infection, due to repeat in 2-3 weeks.      -Urinary incontinence: She reports this occurs o 4.0 - 11.0 x10(3) uL    RBC 4.35 3.80 - 5.30 x10(6)uL    HGB 14.5 12.0 - 16.0 g/dL    HCT 43.1 35.0 - 48.0 %    MCV 99.1 80.0 - 100.0 fL    MCH 33.3 26.0 - 34.0 pg    MCHC 33.6 31.0 - 37.0 g/dL    RDW-SD 42.9 35.1 - 46.3 fL    RDW 11.7 11.0 - 15.0 %    PLT History:  Family History   Problem Relation Age of Onset   • Cancer Father         lung   • Other (Other) Father         COPD   • Diabetes Mother    • Heart Disorder Mother    • Psychiatric Mother         alzheimers   • Cancer Maternal Grandmother albuterol PRN for cough/mild shortness of breath, reports her SOB has improved as well. Quit smoking this past Saturday. GASTROINTESTINAL:  Denies abdominal pain, nausea, vomiting, constipation, diarrhea, or blood in stool.   GENITOURINARY: Denies dysuria, Regular rate and rhythm, no murmurs, rubs or gallops. LUNGS: Clear to auscultation bilterally, no rales/rhonchi/wheezing. Good air flow auscultated throughout, chest expansion symmetrical.  CHEST: No tenderness to palpation.   BREASTS: She declined exam. use.  -May use saline nasal rinse.  -Humidifier and pushing PO fluids encouraged.  -To report if facial pressure, headaches, dizziness, or fever.     4. [facility]  -F/U with psych and counselor as scheduled, t Sherryle Sergeant  6/29/2020  10:42 AM

## 2020-09-17 ENCOUNTER — PATIENT MESSAGE (OUTPATIENT)
Dept: FAMILY MEDICINE CLINIC | Facility: CLINIC | Age: 60
End: 2020-09-17

## 2020-09-17 NOTE — TELEPHONE ENCOUNTER
From: Hiro Mayer  To: Xochitl Preciado MD  Sent: 9/17/2020 11:31 AM CDT  Subject: Non-Urgent Medical Question    Dear Dr. Yamilet Mark,     In 2015, I had a flu shot and had a reaction.  My daughter was dating a doctor who called me several times over

## 2021-01-14 ENCOUNTER — PATIENT MESSAGE (OUTPATIENT)
Dept: FAMILY MEDICINE CLINIC | Facility: CLINIC | Age: 61
End: 2021-01-14

## 2021-01-15 NOTE — TELEPHONE ENCOUNTER
Spoke with pt, offered apologies for not getting back to her yesterday. She is currently at urgent care. Notified pt that I was going to advice she go to  anyway. Advised pt to call us back if symptoms continue to linger and is not able to get relief.  Pa

## 2021-03-03 ENCOUNTER — OFFICE VISIT (OUTPATIENT)
Dept: FAMILY MEDICINE CLINIC | Facility: CLINIC | Age: 61
End: 2021-03-03
Payer: COMMERCIAL

## 2021-03-03 VITALS
SYSTOLIC BLOOD PRESSURE: 122 MMHG | HEIGHT: 66 IN | BODY MASS INDEX: 25.39 KG/M2 | DIASTOLIC BLOOD PRESSURE: 60 MMHG | HEART RATE: 67 BPM | OXYGEN SATURATION: 99 % | WEIGHT: 158 LBS

## 2021-03-03 DIAGNOSIS — R25.1 TREMOR OF RIGHT HAND: Primary | ICD-10-CM

## 2021-03-03 DIAGNOSIS — F17.200 TOBACCO DEPENDENCE: ICD-10-CM

## 2021-03-03 DIAGNOSIS — F33.0 MILD EPISODE OF RECURRENT MAJOR DEPRESSIVE DISORDER (HCC): ICD-10-CM

## 2021-03-03 DIAGNOSIS — E55.9 VITAMIN D DEFICIENCY: ICD-10-CM

## 2021-03-03 DIAGNOSIS — D72.829 LEUKOCYTOSIS, UNSPECIFIED TYPE: ICD-10-CM

## 2021-03-03 DIAGNOSIS — M85.80 OSTEOPENIA, UNSPECIFIED LOCATION: ICD-10-CM

## 2021-03-03 DIAGNOSIS — Z00.00 ROUTINE MEDICAL EXAM: ICD-10-CM

## 2021-03-03 DIAGNOSIS — R94.6 ABNORMAL THYROID FUNCTION TEST: ICD-10-CM

## 2021-03-03 DIAGNOSIS — F41.1 GENERALIZED ANXIETY DISORDER: ICD-10-CM

## 2021-03-03 DIAGNOSIS — Z12.31 BREAST CANCER SCREENING BY MAMMOGRAM: ICD-10-CM

## 2021-03-03 PROCEDURE — 3008F BODY MASS INDEX DOCD: CPT | Performed by: FAMILY MEDICINE

## 2021-03-03 PROCEDURE — 99215 OFFICE O/P EST HI 40 MIN: CPT | Performed by: FAMILY MEDICINE

## 2021-03-03 PROCEDURE — 3078F DIAST BP <80 MM HG: CPT | Performed by: FAMILY MEDICINE

## 2021-03-03 PROCEDURE — 3074F SYST BP LT 130 MM HG: CPT | Performed by: FAMILY MEDICINE

## 2021-03-03 RX ORDER — POLYETHYLENE GLYCOL 3350 17 G
POWDER IN PACKET (EA) ORAL
COMMUNITY
Start: 2021-02-01

## 2021-03-08 NOTE — PROGRESS NOTES
HPI:   Patient presents with: Follow - Up: no physical just going to get labs  Tremors: right hand, started after she had taken prednisone      Izabel Vizcarra is a 56year old female.     She primarily presents for:  Tremor of right hand, persistent > 1 (KLONOPIN) 0.5 MG Oral Tab Take 0.5 mg by mouth as needed.         Past Medical History:   Diagnosis Date   • Alcoholism ([facility] [address]    • Capsulitis of left foot 3/8/2016   • Cerebral aneurysm without rupture 12/2010   • Cerebral aneurysm without rupture    • DEP GI/rectal bleeding, No N/V/D/C  :  No dysuria/hematuria  MS:  No joint pain or swelling B  NEURO: No numbness, tingling or weakness  PSYCH:  SA     EXAM:   /60   Pulse 67   Ht 5' 6\" (1.676 m)   Wt 158 lb (71.7 kg)   SpO2 99%   BMI 25.50 kg/m²  Est and with neuro as referred  - CBC WITH DIFFERENTIAL WITH PLATELET; Future  - COMP METABOLIC PANEL (14); Future  - TSH W REFLEX TO FREE T4; Future  - VITAMIN B12; Future  - NEURO - INTERNAL  - CORTISOL; Future    2.  Generalized anxiety disorder  Appears rel lifestyle recommendations, diagnostic testing options, treatment options, risks and benefits of my recommendations, counseling patient, discussing prognosis/expectations, and follow up with patient (and/or parent/guardian) as well as completing documentati

## 2021-04-12 DIAGNOSIS — Z23 NEED FOR VACCINATION: ICD-10-CM

## 2021-12-30 ENCOUNTER — TELEMEDICINE (OUTPATIENT)
Dept: FAMILY MEDICINE CLINIC | Facility: CLINIC | Age: 61
End: 2021-12-30
Payer: COMMERCIAL

## 2021-12-30 DIAGNOSIS — Z20.822 EXPOSURE TO COVID-19 VIRUS: Primary | ICD-10-CM

## 2021-12-30 DIAGNOSIS — R05.9 COUGH: ICD-10-CM

## 2021-12-30 DIAGNOSIS — R09.81 NASAL CONGESTION: ICD-10-CM

## 2021-12-30 PROCEDURE — 99213 OFFICE O/P EST LOW 20 MIN: CPT | Performed by: NURSE PRACTITIONER

## 2021-12-30 NOTE — PROGRESS NOTES
Due to the real risk of possible exposure to Coronavirus (CoV-2, COVID-19) in the office/medical building and recommendations for social distancing (key to mitigation/limiting the spread of the virus) a Virtual or Telemedicine visit over the phone was perf above.  Coding/billing information is submitted for this visit based on complexity of care and/or time spent for the visit.     HPI:  Patient presents with:  Acute: Cold symptoms      Fracisco Briseno is a 59year old female who calls for complaints of:    C rupture 12/2010   • Cerebral aneurysm without rupture    • DEPRESSION    • Ectopic pregnancy x2    x2   • Mitral valve prolapse    • Panic attacks      Past Surgical History:   Procedure Laterality Date   • COLONOSCOPY,DIAGNOSTIC  7/19/2012    Procedure: C minutes

## 2021-12-30 NOTE — PATIENT INSTRUCTIONS
Coronavirus Disease 2019 (COVID-19)     [facility] [address] is committed to the safety and well-being of our patients, members, employees, and communities.  As concerns arise about the new strain of coronavirus that causes COVID-19, [facility] [address] exposure  • After day 7 from date of last exposure with a negative test result (test must occur on day 5 or later)  After stopping quarantine, you should  • Watch for symptoms until 14 days after exposure.   • If you have symptoms, immediately self-isolate Care     If you are awaiting test results or are confirmed positive for COVID -19, and your symptoms worsen at home with symptoms such as: extreme weakness, difficult breathing, or unrelenting fevers greater than 100.4 degrees Fahrenheit, you should contac Follow-up  If you are diagnosed with COVID, refrain from exercise until approved by your primary care provider. Please call your primary care provider within 2 days of your discharge to arrange for a telehealth follow-up.  CDC does not recommend repeat test Control & Prevention (CDC)  10 things you can do to manage your health at home, [email or web address]. pdf  [email or web address] Retrieved March 17, 2021, from [email or web address]/[email or web address]/covid-19-information/covid-19-long-haulers. html  Long-term effects of covid-19. (n.d.).  Retrieved May 11, 2021, from [employer] (oxygen saturation, or O2 sat) may be checked often. This is to make sure your lungs are getting enough oxygen into your body. Your O2 sat level may be checked after each time you change position.   Getting ready for proning at home  Before you do prone pos needed. · If you have neck problems, you can fold a towel into a horseshoe shape to support your face. This will let you lie face down without turning your head to the side. · Try putting your arms in different positions to see what is most comfortable.

## 2022-02-04 ENCOUNTER — TELEMEDICINE (OUTPATIENT)
Dept: FAMILY MEDICINE CLINIC | Facility: CLINIC | Age: 62
End: 2022-02-04

## 2022-02-04 DIAGNOSIS — Z20.822 SUSPECTED COVID-19 VIRUS INFECTION: Primary | ICD-10-CM

## 2022-02-04 DIAGNOSIS — R05.9 COUGH: ICD-10-CM

## 2022-02-04 DIAGNOSIS — Z20.822 EXPOSURE TO COVID-19 VIRUS: ICD-10-CM

## 2022-02-04 DIAGNOSIS — R50.9 FEVER, UNSPECIFIED FEVER CAUSE: ICD-10-CM

## 2022-02-04 DIAGNOSIS — R09.81 NASAL CONGESTION: ICD-10-CM

## 2022-02-04 PROCEDURE — 99214 OFFICE O/P EST MOD 30 MIN: CPT | Performed by: FAMILY MEDICINE

## 2022-02-04 RX ORDER — ALBUTEROL SULFATE 90 UG/1
2 AEROSOL, METERED RESPIRATORY (INHALATION) EVERY 6 HOURS PRN
Qty: 1 EACH | Refills: 1 | Status: SHIPPED | OUTPATIENT
Start: 2022-02-04

## 2022-02-08 ENCOUNTER — HOSPITAL ENCOUNTER (OUTPATIENT)
Dept: GENERAL RADIOLOGY | Age: 62
Discharge: HOME | End: 2022-02-08
Attending: FAMILY MEDICINE
Payer: COMMERCIAL

## 2022-02-08 DIAGNOSIS — R05.8 PRODUCTIVE COUGH: ICD-10-CM

## 2022-02-08 PROCEDURE — 71046 X-RAY EXAM CHEST 2 VIEWS: CPT | Performed by: FAMILY MEDICINE

## 2022-02-10 NOTE — PROGRESS NOTES
Rey Fletcher,    Attached are the results of your recently performed labs/tests: All results are essentially within NORMAL limits. Follow up: With me as needed.     Take care,     Tammy Chandra MD  2/10/2022    (Report(s) are attached, future lab/test orders or any referrals are in your chart/MyChart or will be mailed to you)

## 2022-06-01 ENCOUNTER — LAB REQUISITION (OUTPATIENT)
Dept: LAB | Age: 62
End: 2022-06-01

## 2022-06-01 DIAGNOSIS — Z00.00 ENCOUNTER FOR GENERAL ADULT MEDICAL EXAMINATION WITHOUT ABNORMAL FINDINGS: ICD-10-CM

## 2022-06-01 PROCEDURE — PSEU9049 QUANTIFERON TB PLUS: Performed by: CLINICAL MEDICAL LABORATORY

## 2022-06-01 PROCEDURE — 86480 TB TEST CELL IMMUN MEASURE: CPT | Performed by: CLINICAL MEDICAL LABORATORY

## 2022-06-03 LAB
GAMMA INTERFERON BACKGROUND BLD IA-ACNC: 0.03 IU/ML
M TB IFN-G BLD-IMP: NEGATIVE
M TB IFN-G CD4+ BCKGRND COR BLD-ACNC: 0.08 IU/ML
M TB IFN-G CD4+CD8+ BCKGRND COR BLD-ACNC: 0.09 IU/ML
MITOGEN IGNF BCKGRD COR BLD-ACNC: >10 IU/ML

## 2022-06-06 PROBLEM — E66.3 OVERWEIGHT WITH BODY MASS INDEX (BMI) 25.0-29.9: Status: ACTIVE | Noted: 2019-07-15

## 2022-06-09 ENCOUNTER — OFFICE VISIT (OUTPATIENT)
Dept: FAMILY MEDICINE CLINIC | Facility: CLINIC | Age: 62
End: 2022-06-09
Payer: COMMERCIAL

## 2022-06-09 VITALS
BODY MASS INDEX: 25.88 KG/M2 | OXYGEN SATURATION: 97 % | WEIGHT: 161 LBS | RESPIRATION RATE: 16 BRPM | SYSTOLIC BLOOD PRESSURE: 126 MMHG | HEIGHT: 66 IN | HEART RATE: 79 BPM | DIASTOLIC BLOOD PRESSURE: 78 MMHG

## 2022-06-09 DIAGNOSIS — K64.9 HEMORRHOIDS, UNSPECIFIED HEMORRHOID TYPE: ICD-10-CM

## 2022-06-09 DIAGNOSIS — F33.0 MILD EPISODE OF RECURRENT MAJOR DEPRESSIVE DISORDER (HCC): ICD-10-CM

## 2022-06-09 DIAGNOSIS — Z12.11 SCREENING FOR COLON CANCER: ICD-10-CM

## 2022-06-09 DIAGNOSIS — Z02.1 PHYSICAL EXAM, PRE-EMPLOYMENT: ICD-10-CM

## 2022-06-09 DIAGNOSIS — Z00.00 ADULT GENERAL MEDICAL EXAMINATION: Primary | ICD-10-CM

## 2022-06-09 DIAGNOSIS — K62.5 BRBPR (BRIGHT RED BLOOD PER RECTUM): ICD-10-CM

## 2022-06-09 DIAGNOSIS — N39.41 URGE INCONTINENCE: ICD-10-CM

## 2022-06-09 DIAGNOSIS — E55.9 VITAMIN D DEFICIENCY: ICD-10-CM

## 2022-06-09 DIAGNOSIS — F41.1 GENERALIZED ANXIETY DISORDER: ICD-10-CM

## 2022-06-09 DIAGNOSIS — Z12.31 SCREENING MAMMOGRAM, ENCOUNTER FOR: ICD-10-CM

## 2022-06-09 PROCEDURE — 3078F DIAST BP <80 MM HG: CPT | Performed by: NURSE PRACTITIONER

## 2022-06-09 PROCEDURE — 99213 OFFICE O/P EST LOW 20 MIN: CPT | Performed by: NURSE PRACTITIONER

## 2022-06-09 PROCEDURE — 99396 PREV VISIT EST AGE 40-64: CPT | Performed by: NURSE PRACTITIONER

## 2022-06-09 PROCEDURE — 3074F SYST BP LT 130 MM HG: CPT | Performed by: NURSE PRACTITIONER

## 2022-06-09 PROCEDURE — 3008F BODY MASS INDEX DOCD: CPT | Performed by: NURSE PRACTITIONER

## 2022-09-09 ENCOUNTER — APPOINTMENT (OUTPATIENT)
Dept: GENERAL RADIOLOGY | Age: 62
End: 2022-09-09
Attending: NURSE PRACTITIONER

## 2022-09-09 ENCOUNTER — HOSPITAL ENCOUNTER (OUTPATIENT)
Age: 62
Discharge: HOME | End: 2022-09-09

## 2022-09-09 VITALS
HEART RATE: 71 BPM | DIASTOLIC BLOOD PRESSURE: 79 MMHG | OXYGEN SATURATION: 97 % | RESPIRATION RATE: 20 BRPM | BODY MASS INDEX: 24.11 KG/M2 | HEIGHT: 66 IN | TEMPERATURE: 98 F | WEIGHT: 150 LBS | SYSTOLIC BLOOD PRESSURE: 134 MMHG

## 2022-09-09 DIAGNOSIS — M25.562 ARTHRALGIA OF LEFT LOWER LEG: Primary | ICD-10-CM

## 2022-09-09 DIAGNOSIS — T14.8XXA MUSCLE STRAIN: ICD-10-CM

## 2022-09-09 PROCEDURE — 73502 X-RAY EXAM HIP UNI 2-3 VIEWS: CPT | Performed by: NURSE PRACTITIONER

## 2022-09-09 PROCEDURE — 99213 OFFICE O/P EST LOW 20 MIN: CPT | Performed by: NURSE PRACTITIONER

## 2022-09-09 NOTE — ED INITIAL ASSESSMENT (HPI)
Pt states she stepped over her dog and her foot slide  on the hardwood floor. Pt c/o pain posterior upper left thigh. Pt denies head injury.

## 2023-02-05 ENCOUNTER — HOSPITAL ENCOUNTER (OUTPATIENT)
Age: 63
Discharge: HOME | End: 2023-02-05
Payer: COMMERCIAL

## 2023-02-05 VITALS
HEIGHT: 66.5 IN | SYSTOLIC BLOOD PRESSURE: 134 MMHG | RESPIRATION RATE: 17 BRPM | HEART RATE: 84 BPM | DIASTOLIC BLOOD PRESSURE: 88 MMHG | BODY MASS INDEX: 25.41 KG/M2 | TEMPERATURE: 98 F | WEIGHT: 160 LBS | OXYGEN SATURATION: 97 %

## 2023-02-05 DIAGNOSIS — J01.10 ACUTE NON-RECURRENT FRONTAL SINUSITIS: Primary | ICD-10-CM

## 2023-02-05 RX ORDER — PREDNISONE 20 MG/1
40 TABLET ORAL DAILY
Qty: 10 TABLET | Refills: 0 | Status: SHIPPED | OUTPATIENT
Start: 2023-02-05 | End: 2023-02-10

## 2023-02-05 NOTE — DISCHARGE INSTRUCTIONS
Most people get better in 10 to 14 days. You may continue to cough for 2 to 3 weeks. Colds are caused by viruses and do not get better with antibiotics. Any over-the-counter medications will help relieve your symptoms. Mucinex D, this can be obtained from the pharmacist and a 's license is needed to purchase this.   Otherwise Mucinex DM is a decent alternative  Nasal decongestant sprays such as phenylalanine or Afrin  Afrin should only be used for a maximum of 3 days in a row  Start taking one of the following allergy medications Zyrtec, Claritin or Xyzal daily  Cough syrup such as Delsym or Robitussin can help  You may also take pseudoephedrine (Sudafed), this is also purchased from the pharmacist with a valid 's license  Drink plenty of fluids  Rest, Tylenol and Motrin for aches and pains  Return for fever, shortness of breath, chest pain

## 2023-02-05 NOTE — ED INITIAL ASSESSMENT (HPI)
Swelling below left eye, says feels something draining between eye & nose. No oral swelling or resp distress. Onset 24 hours.

## 2023-05-30 ENCOUNTER — HOSPITAL ENCOUNTER (OUTPATIENT)
Age: 63
Discharge: HOME | End: 2023-05-30
Payer: COMMERCIAL

## 2023-05-30 VITALS
HEART RATE: 88 BPM | SYSTOLIC BLOOD PRESSURE: 144 MMHG | DIASTOLIC BLOOD PRESSURE: 93 MMHG | TEMPERATURE: 97 F | RESPIRATION RATE: 16 BRPM | OXYGEN SATURATION: 96 %

## 2023-05-30 DIAGNOSIS — R19.7 DIARRHEA, UNSPECIFIED TYPE: Primary | ICD-10-CM

## 2023-05-30 LAB
#MXD IC: 0.5 X10ˆ3/UL (ref 0.1–1)
BUN BLD-MCNC: <5 MG/DL (ref 7–18)
CHLORIDE BLD-SCNC: 101 MMOL/L (ref 98–112)
CO2 BLD-SCNC: 28 MMOL/L (ref 21–32)
CREAT BLD-MCNC: 0.7 MG/DL
GFR SERPLBLD BASED ON 1.73 SQ M-ARVRAT: 97 ML/MIN/1.73M2 (ref 60–?)
GLUCOSE BLD-MCNC: 105 MG/DL (ref 70–99)
HCT VFR BLD AUTO: 41.1 %
HCT VFR BLD CALC: 43 %
HGB BLD-MCNC: 14.2 G/DL
ISTAT IONIZED CALCIUM FOR CHEM 8: 1.13 MMOL/L (ref 1.12–1.32)
LYMPHOCYTES # BLD AUTO: 0.8 X10ˆ3/UL (ref 1–4)
LYMPHOCYTES NFR BLD AUTO: 14.9 %
MCH RBC QN AUTO: 34.7 PG (ref 26–34)
MCHC RBC AUTO-ENTMCNC: 34.5 G/DL (ref 31–37)
MCV RBC AUTO: 100.5 FL (ref 80–100)
MIXED CELL %: 8.2 %
NEUTROPHILS # BLD AUTO: 4.3 X10ˆ3/UL (ref 1.5–7.7)
NEUTROPHILS NFR BLD AUTO: 76.9 %
PLATELET # BLD AUTO: 192 X10ˆ3/UL (ref 150–450)
POTASSIUM BLD-SCNC: 3.5 MMOL/L (ref 3.6–5.1)
RBC # BLD AUTO: 4.09 X10ˆ6/UL
SODIUM BLD-SCNC: 140 MMOL/L (ref 136–145)
WBC # BLD AUTO: 5.6 X10ˆ3/UL (ref 4–11)

## 2023-05-30 PROCEDURE — 85025 COMPLETE CBC W/AUTO DIFF WBC: CPT | Performed by: PHYSICIAN ASSISTANT

## 2023-05-30 PROCEDURE — 99213 OFFICE O/P EST LOW 20 MIN: CPT | Performed by: PHYSICIAN ASSISTANT

## 2023-05-30 PROCEDURE — 80047 BASIC METABLC PNL IONIZED CA: CPT | Performed by: PHYSICIAN ASSISTANT

## 2023-05-30 RX ORDER — DICYCLOMINE HCL 20 MG
20 TABLET ORAL 4 TIMES DAILY PRN
Qty: 30 TABLET | Refills: 0 | Status: SHIPPED | OUTPATIENT
Start: 2023-05-30 | End: 2023-06-29

## 2023-05-30 NOTE — ED INITIAL ASSESSMENT (HPI)
Fever of 100.3 since Sunday, diarrhea and cramping and reflux. BM 6-7 x a day, some watery and some loose. Abdominal cramping generalize. Taking otc reflux meds.

## 2023-05-30 NOTE — DISCHARGE INSTRUCTIONS
Low fiber diet. Bentyl as needed for abdominal cramping. If diarrhea does not resolve complete stool studies.   If you develop acute fever, blood or mucus in stool, report to the ER

## 2023-09-24 PROBLEM — F10.90 ALCOHOL USE DISORDER: Status: ACTIVE | Noted: 2023-09-24

## 2023-10-12 ENCOUNTER — LAB ENCOUNTER (OUTPATIENT)
Dept: LAB | Facility: REFERENCE LAB | Age: 63
End: 2023-10-12
Attending: NURSE PRACTITIONER
Payer: COMMERCIAL

## 2023-10-12 ENCOUNTER — OFFICE VISIT (OUTPATIENT)
Dept: FAMILY MEDICINE CLINIC | Facility: CLINIC | Age: 63
End: 2023-10-12
Payer: COMMERCIAL

## 2023-10-12 VITALS
BODY MASS INDEX: 25.26 KG/M2 | WEIGHT: 157.19 LBS | OXYGEN SATURATION: 96 % | HEART RATE: 67 BPM | TEMPERATURE: 97 F | DIASTOLIC BLOOD PRESSURE: 82 MMHG | HEIGHT: 66 IN | SYSTOLIC BLOOD PRESSURE: 124 MMHG

## 2023-10-12 DIAGNOSIS — Z00.00 ADULT GENERAL MEDICAL EXAMINATION: Primary | ICD-10-CM

## 2023-10-12 DIAGNOSIS — F33.0 MILD EPISODE OF RECURRENT MAJOR DEPRESSIVE DISORDER (HCC): ICD-10-CM

## 2023-10-12 DIAGNOSIS — K62.5 BLOOD PER RECTUM: ICD-10-CM

## 2023-10-12 DIAGNOSIS — F10.10 ALCOHOL ABUSE: ICD-10-CM

## 2023-10-12 DIAGNOSIS — F41.9 ANXIETY: ICD-10-CM

## 2023-10-12 DIAGNOSIS — R79.89 ABNORMAL CBC: Primary | ICD-10-CM

## 2023-10-12 DIAGNOSIS — Z28.21 INFLUENZA VACCINATION DECLINED BY PATIENT: ICD-10-CM

## 2023-10-12 DIAGNOSIS — Z12.31 SCREENING MAMMOGRAM, ENCOUNTER FOR: ICD-10-CM

## 2023-10-12 DIAGNOSIS — Z00.00 ADULT GENERAL MEDICAL EXAMINATION: ICD-10-CM

## 2023-10-12 DIAGNOSIS — K64.9 HEMORRHOIDS, UNSPECIFIED HEMORRHOID TYPE: ICD-10-CM

## 2023-10-12 LAB
ALBUMIN SERPL-MCNC: 3.8 G/DL (ref 3.4–5)
ALBUMIN/GLOB SERPL: 1.1 {RATIO} (ref 1–2)
ALP LIVER SERPL-CCNC: 86 U/L
ALT SERPL-CCNC: 42 U/L
ANION GAP SERPL CALC-SCNC: 5 MMOL/L (ref 0–18)
AST SERPL-CCNC: 24 U/L (ref 15–37)
BASOPHILS # BLD AUTO: 0.04 X10(3) UL (ref 0–0.2)
BASOPHILS NFR BLD AUTO: 0.7 %
BILIRUB SERPL-MCNC: 0.5 MG/DL (ref 0.1–2)
BUN BLD-MCNC: 10 MG/DL (ref 7–18)
BUN/CREAT SERPL: 11.9 (ref 10–20)
CALCIUM BLD-MCNC: 9.6 MG/DL (ref 8.5–10.1)
CHLORIDE SERPL-SCNC: 107 MMOL/L (ref 98–112)
CHOLEST SERPL-MCNC: 215 MG/DL (ref ?–200)
CO2 SERPL-SCNC: 29 MMOL/L (ref 21–32)
CREAT BLD-MCNC: 0.84 MG/DL
DEPRECATED HBV CORE AB SER IA-ACNC: 174.2 NG/ML
DEPRECATED RDW RBC AUTO: 43.7 FL (ref 35.1–46.3)
EGFRCR SERPLBLD CKD-EPI 2021: 78 ML/MIN/1.73M2 (ref 60–?)
EOSINOPHIL # BLD AUTO: 0.04 X10(3) UL (ref 0–0.7)
EOSINOPHIL NFR BLD AUTO: 0.7 %
ERYTHROCYTE [DISTWIDTH] IN BLOOD BY AUTOMATED COUNT: 11.5 % (ref 11–15)
EST. AVERAGE GLUCOSE BLD GHB EST-MCNC: 111 MG/DL (ref 68–126)
FASTING PATIENT LIPID ANSWER: YES
FASTING STATUS PATIENT QL REPORTED: YES
GLOBULIN PLAS-MCNC: 3.6 G/DL (ref 2.8–4.4)
GLUCOSE BLD-MCNC: 87 MG/DL (ref 70–99)
HBA1C MFR BLD: 5.5 % (ref ?–5.7)
HCT VFR BLD AUTO: 47.2 %
HDLC SERPL-MCNC: 92 MG/DL (ref 40–59)
HGB BLD-MCNC: 16.1 G/DL
IMM GRANULOCYTES # BLD AUTO: 0.01 X10(3) UL (ref 0–1)
IMM GRANULOCYTES NFR BLD: 0.2 %
INR BLD: 0.86 (ref 0.8–1.2)
IRON SATN MFR SERPL: 28 %
IRON SERPL-MCNC: 98 UG/DL
LDLC SERPL CALC-MCNC: 113 MG/DL (ref ?–100)
LYMPHOCYTES # BLD AUTO: 1.46 X10(3) UL (ref 1–4)
LYMPHOCYTES NFR BLD AUTO: 26.2 %
MCH RBC QN AUTO: 35 PG (ref 26–34)
MCHC RBC AUTO-ENTMCNC: 34.1 G/DL (ref 31–37)
MCV RBC AUTO: 102.6 FL
MONOCYTES # BLD AUTO: 0.43 X10(3) UL (ref 0.1–1)
MONOCYTES NFR BLD AUTO: 7.7 %
NEUTROPHILS # BLD AUTO: 3.6 X10 (3) UL (ref 1.5–7.7)
NEUTROPHILS # BLD AUTO: 3.6 X10(3) UL (ref 1.5–7.7)
NEUTROPHILS NFR BLD AUTO: 64.5 %
NONHDLC SERPL-MCNC: 123 MG/DL (ref ?–130)
OSMOLALITY SERPL CALC.SUM OF ELEC: 290 MOSM/KG (ref 275–295)
PLATELET # BLD AUTO: 286 10(3)UL (ref 150–450)
POTASSIUM SERPL-SCNC: 4.1 MMOL/L (ref 3.5–5.1)
PROT SERPL-MCNC: 7.4 G/DL (ref 6.4–8.2)
PROTHROMBIN TIME: 12.2 SECONDS (ref 11.6–14.8)
RBC # BLD AUTO: 4.6 X10(6)UL
SODIUM SERPL-SCNC: 141 MMOL/L (ref 136–145)
TIBC SERPL-MCNC: 350 UG/DL (ref 240–450)
TRANSFERRIN SERPL-MCNC: 235 MG/DL (ref 200–360)
TRIGL SERPL-MCNC: 54 MG/DL (ref 30–149)
TSI SER-ACNC: 0.46 MIU/ML (ref 0.36–3.74)
VLDLC SERPL CALC-MCNC: 9 MG/DL (ref 0–30)
WBC # BLD AUTO: 5.6 X10(3) UL (ref 4–11)

## 2023-10-12 PROCEDURE — 80061 LIPID PANEL: CPT | Performed by: NURSE PRACTITIONER

## 2023-10-12 PROCEDURE — 3008F BODY MASS INDEX DOCD: CPT | Performed by: NURSE PRACTITIONER

## 2023-10-12 PROCEDURE — 84466 ASSAY OF TRANSFERRIN: CPT | Performed by: NURSE PRACTITIONER

## 2023-10-12 PROCEDURE — 83540 ASSAY OF IRON: CPT | Performed by: NURSE PRACTITIONER

## 2023-10-12 PROCEDURE — 3074F SYST BP LT 130 MM HG: CPT | Performed by: NURSE PRACTITIONER

## 2023-10-12 PROCEDURE — 80050 GENERAL HEALTH PANEL: CPT | Performed by: NURSE PRACTITIONER

## 2023-10-12 PROCEDURE — 3079F DIAST BP 80-89 MM HG: CPT | Performed by: NURSE PRACTITIONER

## 2023-10-12 PROCEDURE — 99396 PREV VISIT EST AGE 40-64: CPT | Performed by: NURSE PRACTITIONER

## 2023-10-12 PROCEDURE — 82728 ASSAY OF FERRITIN: CPT | Performed by: NURSE PRACTITIONER

## 2023-10-12 PROCEDURE — 83036 HEMOGLOBIN GLYCOSYLATED A1C: CPT | Performed by: NURSE PRACTITIONER

## 2023-10-12 PROCEDURE — 99213 OFFICE O/P EST LOW 20 MIN: CPT | Performed by: NURSE PRACTITIONER

## 2023-10-12 PROCEDURE — 85610 PROTHROMBIN TIME: CPT | Performed by: NURSE PRACTITIONER

## 2023-10-12 RX ORDER — POLYETHYLENE GLYCOL-3350 AND ELECTROLYTES WITH FLAVOR PACK 240; 5.84; 2.98; 6.72; 22.72 G/278.26G; G/278.26G; G/278.26G; G/278.26G; G/278.26G
POWDER, FOR SOLUTION ORAL
COMMUNITY
Start: 2023-10-09

## 2023-10-13 ENCOUNTER — TELEPHONE (OUTPATIENT)
Dept: FAMILY MEDICINE CLINIC | Facility: CLINIC | Age: 63
End: 2023-10-13

## 2023-10-13 NOTE — TELEPHONE ENCOUNTER
Pt requested blood test results to be faxed to her GI doctor [facility] fax number [phone]

## 2023-11-12 ENCOUNTER — PATIENT MESSAGE (OUTPATIENT)
Dept: FAMILY MEDICINE CLINIC | Facility: CLINIC | Age: 63
End: 2023-11-12

## 2023-11-16 ENCOUNTER — LAB ENCOUNTER (OUTPATIENT)
Dept: LAB | Age: 63
End: 2023-11-16
Attending: NURSE PRACTITIONER
Payer: COMMERCIAL

## 2023-11-16 DIAGNOSIS — R79.89 ABNORMAL CBC: ICD-10-CM

## 2023-11-16 LAB
BASOPHILS # BLD AUTO: 0.04 X10(3) UL (ref 0–0.2)
BASOPHILS NFR BLD AUTO: 0.7 %
EOSINOPHIL # BLD AUTO: 0.06 X10(3) UL (ref 0–0.7)
EOSINOPHIL NFR BLD AUTO: 1.1 %
ERYTHROCYTE [DISTWIDTH] IN BLOOD BY AUTOMATED COUNT: 11.7 %
HCT VFR BLD AUTO: 42 %
HGB BLD-MCNC: 14.1 G/DL
IMM GRANULOCYTES # BLD AUTO: 0.01 X10(3) UL (ref 0–1)
IMM GRANULOCYTES NFR BLD: 0.2 %
LYMPHOCYTES # BLD AUTO: 1.7 X10(3) UL (ref 1–4)
LYMPHOCYTES NFR BLD AUTO: 30 %
MCH RBC QN AUTO: 34.1 PG (ref 26–34)
MCHC RBC AUTO-ENTMCNC: 33.6 G/DL (ref 31–37)
MCV RBC AUTO: 101.4 FL
MONOCYTES # BLD AUTO: 0.57 X10(3) UL (ref 0.1–1)
MONOCYTES NFR BLD AUTO: 10.1 %
NEUTROPHILS # BLD AUTO: 3.28 X10 (3) UL (ref 1.5–7.7)
NEUTROPHILS # BLD AUTO: 3.28 X10(3) UL (ref 1.5–7.7)
NEUTROPHILS NFR BLD AUTO: 57.9 %
PLATELET # BLD AUTO: 258 10(3)UL (ref 150–450)
RBC # BLD AUTO: 4.14 X10(6)UL
WBC # BLD AUTO: 5.7 X10(3) UL (ref 4–11)

## 2023-11-16 PROCEDURE — 85025 COMPLETE CBC W/AUTO DIFF WBC: CPT

## 2024-01-10 ENCOUNTER — OFFICE VISIT (OUTPATIENT)
Dept: FAMILY MEDICINE CLINIC | Facility: CLINIC | Age: 64
End: 2024-01-10
Payer: COMMERCIAL

## 2024-01-10 VITALS
HEART RATE: 72 BPM | DIASTOLIC BLOOD PRESSURE: 80 MMHG | TEMPERATURE: 98 F | OXYGEN SATURATION: 98 % | HEIGHT: 66 IN | WEIGHT: 160.63 LBS | BODY MASS INDEX: 25.81 KG/M2 | SYSTOLIC BLOOD PRESSURE: 130 MMHG

## 2024-01-10 DIAGNOSIS — R20.2 TINGLING SENSATION: ICD-10-CM

## 2024-01-10 DIAGNOSIS — L98.8 SKIN LESION OF BREAST: ICD-10-CM

## 2024-01-10 DIAGNOSIS — M79.601 DIFFUSE PAIN IN RIGHT UPPER EXTREMITY: Primary | ICD-10-CM

## 2024-01-10 PROCEDURE — 99214 OFFICE O/P EST MOD 30 MIN: CPT | Performed by: STUDENT IN AN ORGANIZED HEALTH CARE EDUCATION/TRAINING PROGRAM

## 2024-01-10 PROCEDURE — 3075F SYST BP GE 130 - 139MM HG: CPT | Performed by: STUDENT IN AN ORGANIZED HEALTH CARE EDUCATION/TRAINING PROGRAM

## 2024-01-10 PROCEDURE — 3008F BODY MASS INDEX DOCD: CPT | Performed by: STUDENT IN AN ORGANIZED HEALTH CARE EDUCATION/TRAINING PROGRAM

## 2024-01-10 PROCEDURE — 3079F DIAST BP 80-89 MM HG: CPT | Performed by: STUDENT IN AN ORGANIZED HEALTH CARE EDUCATION/TRAINING PROGRAM

## 2024-01-10 RX ORDER — NAPROXEN 500 MG/1
500 TABLET ORAL 2 TIMES DAILY WITH MEALS
Qty: 10 TABLET | Refills: 0 | Status: SHIPPED | OUTPATIENT
Start: 2024-01-10 | End: 2024-01-15

## 2024-01-10 NOTE — PROGRESS NOTES
Subjective:   Steffany Fletcher is a 63 year old female who presents for Arm Pain (Right arm pain/lesion on left breast )     63-year-old female complaining of right upper extremity pain.  Patient states she was putting on bedsheets and as she was pushing the fitted sheet into the corner felt immediate right upper extremity pain that ranged from the forearm, arm and right shoulder.  The following day started feeling mild tingling sensation at first, second and third digit, more prominently in the second and third digit distal to DIP.  No arm or forearm numbness or tingling.  Has full ROM of fingers, wrist, elbow.  Limited shoulder abduction.    Also mentions wound on left medial breast.  Felt a red swollen pustule and squeezed it 1.5 weeks ago.  Was able to express serosanguineous fluid from it.    History/Other:    Chief Complaint Reviewed and Verified  Nursing Notes Reviewed and   Verified  Tobacco Reviewed  Allergies Reviewed  Medications Reviewed    Problem List Reviewed  Medical History Reviewed  Surgical History   Reviewed  Family History Reviewed  Social History Reviewed         Tobacco:  She currently smokes tobacco.  Social History    Tobacco Use      Smoking status: Every Day        Packs/day: 0.50        Years: 30.00        Additional pack years: 0.00        Total pack years: 15.00        Types: Cigarettes        Last attempt to quit: 6/27/2020        Years since quitting: 3.5      Smokeless tobacco: Never      Tobacco comments: 2-3 cig a day          Current Outpatient Medications   Medication Sig Dispense Refill    naproxen 500 MG Oral Tab Take 1 tablet (500 mg total) by mouth 2 (two) times daily with meals for 5 days. 10 tablet 0    GAVILYTE-C 240 g Oral Recon Soln       propranolol 20 MG Oral Tab Take 1 tablet (20 mg total) by mouth 2 (two) times daily. 60 tablet 0    buPROPion  MG Oral Tablet 24 Hr Take 1 tablet (300 mg total) by mouth daily. 30 tablet 0    gabapentin 300 MG Oral Cap  Take 1 capsule (300 mg total) by mouth 3 (three) times daily. 90 capsule 0    disulfiram 250 MG Oral Tab Take 1 tablet (250 mg total) by mouth daily. 30 tablet 0    disulfiram 250 MG Oral Tab Take 1 tablet (250 mg total) by mouth daily. 30 tablet 0    albuterol 108 (90 Base) MCG/ACT Inhalation Aero Soln Inhale 2 puffs into the lungs every 6 (six) hours as needed for Shortness of Breath.      Cholecalciferol (VITAMIN D) 50 MCG (2000 UT) Oral Tab Take 1 tablet by mouth.           Review of Systems:  Review of Systems   Constitutional:  Negative for chills, diaphoresis and fever.   HENT:  Negative for congestion, ear discharge, ear pain, sinus pressure, sinus pain and sore throat.    Eyes:  Negative for pain and discharge.   Respiratory:  Negative for cough, chest tightness, shortness of breath and wheezing.    Cardiovascular:  Negative for chest pain and palpitations.   Gastrointestinal:  Negative for abdominal pain, diarrhea, nausea and vomiting.   Endocrine: Negative for cold intolerance and heat intolerance.   Genitourinary:  Negative for dysuria, flank pain, frequency and urgency.   Musculoskeletal:  Negative for joint swelling.        Pain in right upper extremity.   Skin:  Negative for rash.        Small wound on left breast.   Neurological:  Negative for dizziness, syncope and headaches.        Tingling sensation in right third and fourth digit.   Psychiatric/Behavioral:  Negative for confusion and hallucinations.        Objective:   /80   Pulse 72   Temp 97.6 °F (36.4 °C)   Ht 5' 6\" (1.676 m)   Wt 160 lb 9.6 oz (72.8 kg)   LMP  (LMP Unknown)   SpO2 98%   BMI 25.92 kg/m²  Estimated body mass index is 25.92 kg/m² as calculated from the following:    Height as of this encounter: 5' 6\" (1.676 m).    Weight as of this encounter: 160 lb 9.6 oz (72.8 kg).  Physical Exam  Exam conducted with a chaperone present (Sisi FAY).   Constitutional:       General: She is not in acute distress.     Appearance:  Normal appearance. She is not ill-appearing or toxic-appearing.   HENT:      Head: Normocephalic and atraumatic.   Cardiovascular:      Rate and Rhythm: Normal rate and regular rhythm.      Heart sounds: Normal heart sounds. No murmur heard.     No gallop.   Pulmonary:      Effort: Pulmonary effort is normal. No respiratory distress.      Breath sounds: Normal breath sounds. No stridor. No wheezing, rhonchi or rales.   Chest:       Abdominal:      General: Bowel sounds are normal.      Palpations: Abdomen is soft.      Tenderness: There is no abdominal tenderness. There is no guarding.   Musculoskeletal:      Cervical back: Normal range of motion and neck supple. No rigidity or tenderness.      Comments: Negative Tinel and Phalon test.  Right shoulder: No tenderness to palpation, limited abduction to 110 degrees.  Right arm: Tenderness to palpation over tricep.  Right elbow: Full ROM, no TTP.  Right forearm tenderness to palpation of superior proximal aspect.  Right wrist: Full ROM, no TTP.  Right fingers: Full ROM.  Mentions tingling at third and fourth digit distal to DIP.   Skin:     General: Skin is warm and dry.   Neurological:      General: No focal deficit present.      Mental Status: She is alert and oriented to person, place, and time. Mental status is at baseline.   Psychiatric:         Mood and Affect: Mood normal.         Behavior: Behavior normal.         Thought Content: Thought content normal.         Judgment: Judgment normal.       Assessment & Plan:     1. Diffuse pain in right upper extremity (Primary)  -Counseled patient about worrisome signs and symptoms that would prompt follow-up  -     Naproxen; Take 1 tablet (500 mg total) by mouth 2 (two) times daily with meals for 5 days.  Dispense: 10 tablet; Refill: 0  2. Tingling sensation  In right third and fourth digit distal to DIP.  -     Naproxen; Take 1 tablet (500 mg total) by mouth 2 (two) times daily with meals for 5 days.  Dispense: 10  tablet; Refill: 0  3. Skin lesion of breast  Had pustule in area that she expressed serosanguineous fluid from around a week ago.  -Apply bacitracin to area  -Counseled patient about worrisome signs and symptoms that would prompt following up        Return if symptoms worsen or fail to improve.    Jordy Rushing MD, 1/10/2024, 11:20 AM         Time spent: 35 minutes, obtaining history, evaluating patient, discussing differential diagnosis, lifestyle recommendations, diagnostic testing options, treatment options, risks and benefits of my recommendations, counseling patient, discussing prognosis/expectations, and follow up with patient  as well as completing documentation.

## 2024-01-17 ENCOUNTER — OFFICE VISIT (OUTPATIENT)
Dept: OTHER | Facility: HOSPITAL | Age: 64
End: 2024-01-17
Attending: EMERGENCY MEDICINE

## 2024-01-17 DIAGNOSIS — Z00.00 ROUTINE GENERAL MEDICAL EXAMINATION AT A HEALTH CARE FACILITY: Primary | ICD-10-CM

## 2024-01-17 PROCEDURE — 86480 TB TEST CELL IMMUN MEASURE: CPT

## 2024-01-19 LAB
M TB IFN-G CD4+ T-CELLS BLD-ACNC: 0 IU/ML
M TB TUBERC IFN-G BLD QL: NEGATIVE
M TB TUBERC IGNF/MITOGEN IGNF CONTROL: >10 IU/ML
QFT TB1 AG MINUS NIL: 0.18 IU/ML
QFT TB2 AG MINUS NIL: 0.13 IU/ML

## 2024-02-07 ENCOUNTER — OFFICE VISIT (OUTPATIENT)
Dept: FAMILY MEDICINE CLINIC | Facility: CLINIC | Age: 64
End: 2024-02-07
Payer: COMMERCIAL

## 2024-02-07 VITALS
HEIGHT: 66 IN | BODY MASS INDEX: 25.26 KG/M2 | SYSTOLIC BLOOD PRESSURE: 118 MMHG | DIASTOLIC BLOOD PRESSURE: 80 MMHG | HEART RATE: 91 BPM | WEIGHT: 157.19 LBS | OXYGEN SATURATION: 95 %

## 2024-02-07 DIAGNOSIS — R00.2 PALPITATIONS: Primary | ICD-10-CM

## 2024-02-07 DIAGNOSIS — R42 DIZZINESS: ICD-10-CM

## 2024-02-07 LAB
ATRIAL RATE: 73 BPM
P AXIS: 70 DEGREES
P-R INTERVAL: 160 MS
Q-T INTERVAL: 394 MS
QRS DURATION: 82 MS
QTC CALCULATION (BEZET): 434 MS
R AXIS: 12 DEGREES
T AXIS: 79 DEGREES
VENTRICULAR RATE: 73 BPM

## 2024-02-07 PROCEDURE — 99214 OFFICE O/P EST MOD 30 MIN: CPT | Performed by: STUDENT IN AN ORGANIZED HEALTH CARE EDUCATION/TRAINING PROGRAM

## 2024-02-07 PROCEDURE — 93000 ELECTROCARDIOGRAM COMPLETE: CPT | Performed by: STUDENT IN AN ORGANIZED HEALTH CARE EDUCATION/TRAINING PROGRAM

## 2024-02-07 NOTE — PROGRESS NOTES
Subjective:   Steffany Fletcher is a 63 year old female who presents for Dizziness (SOB/Palpitation )     63-year-old female states over the past week would have episodes of being aware of heartbeat/tachycardia with sensation of dizziness that would last for 1 to 2 minutes.  Episodes happen once or twice daily.  None today.  No history of major heart disease.  History of anxiety on bupropion and gabapentin.  Drinks 2 cups of coffee in the morning and 1 cup of coffee in the afternoon.  No alcohol intake at this time.  Denies chest pain, LOC.    History/Other:    Chief Complaint Reviewed and Verified  Nursing Notes Reviewed and   Verified  Tobacco Reviewed  Allergies Reviewed  Medications Reviewed    Medical History Reviewed  Surgical History Reviewed  Family History   Reviewed  Social History Reviewed         Tobacco:  She currently smokes tobacco.  Social History    Tobacco Use      Smoking status: Every Day        Packs/day: 0.50        Years: 30.00        Additional pack years: 0.00        Total pack years: 15.00        Types: Cigarettes        Last attempt to quit: 6/27/2020        Years since quitting: 3.6      Smokeless tobacco: Never      Tobacco comments: 2-3 cig a day       Current Outpatient Medications   Medication Sig Dispense Refill    propranolol 20 MG Oral Tab Take 1 tablet (20 mg total) by mouth 2 (two) times daily. 60 tablet 0    buPROPion  MG Oral Tablet 24 Hr Take 1 tablet (300 mg total) by mouth daily. 30 tablet 0    gabapentin 300 MG Oral Cap Take 1 capsule (300 mg total) by mouth 3 (three) times daily. 90 capsule 0    disulfiram 250 MG Oral Tab Take 1 tablet (250 mg total) by mouth daily. 30 tablet 0    albuterol 108 (90 Base) MCG/ACT Inhalation Aero Soln Inhale 2 puffs into the lungs every 6 (six) hours as needed for Shortness of Breath.      Cholecalciferol (VITAMIN D) 50 MCG (2000 UT) Oral Tab Take 1 tablet by mouth.           Review of Systems:  Review of Systems    Constitutional:  Negative for chills, diaphoresis and fever.   HENT:  Negative for congestion, ear discharge, ear pain, sinus pressure, sinus pain and sore throat.    Eyes:  Negative for pain and discharge.   Respiratory:  Negative for cough, chest tightness, shortness of breath and wheezing.    Cardiovascular:  Positive for palpitations. Negative for chest pain.   Gastrointestinal:  Negative for abdominal pain, diarrhea, nausea and vomiting.   Endocrine: Negative for cold intolerance and heat intolerance.   Genitourinary:  Negative for dysuria, flank pain, frequency and urgency.   Musculoskeletal:  Negative for joint swelling.   Skin:  Negative for rash.   Neurological:  Positive for dizziness. Negative for syncope and headaches.   Psychiatric/Behavioral:  Negative for confusion and hallucinations.        Objective:   /80   Pulse 91   Ht 5' 6\" (1.676 m)   Wt 157 lb 3.2 oz (71.3 kg)   LMP  (LMP Unknown)   SpO2 95%   BMI 25.37 kg/m²  Estimated body mass index is 25.37 kg/m² as calculated from the following:    Height as of this encounter: 5' 6\" (1.676 m).    Weight as of this encounter: 157 lb 3.2 oz (71.3 kg).  Physical Exam  Constitutional:       General: She is not in acute distress.     Appearance: Normal appearance. She is not ill-appearing or toxic-appearing.   HENT:      Head: Normocephalic and atraumatic.   Cardiovascular:      Rate and Rhythm: Normal rate and regular rhythm.      Heart sounds: Normal heart sounds. No murmur heard.     No gallop.   Pulmonary:      Effort: Pulmonary effort is normal. No respiratory distress.      Breath sounds: Normal breath sounds. No stridor. No wheezing, rhonchi or rales.   Abdominal:      General: Bowel sounds are normal.      Palpations: Abdomen is soft.      Tenderness: There is no abdominal tenderness. There is no guarding.   Musculoskeletal:         General: No swelling.      Cervical back: Normal range of motion and neck supple.   Skin:     General: Skin  is warm and dry.   Neurological:      General: No focal deficit present.      Mental Status: She is alert and oriented to person, place, and time. Mental status is at baseline.   Psychiatric:         Mood and Affect: Mood normal.         Behavior: Behavior normal.         Thought Content: Thought content normal.         Judgment: Judgment normal.         Assessment & Plan:     1. Palpitations (Primary)  Mentions a history of mitral valve prolapse with last echo many years ago.  EKG in office NSR at 73 bpm  -ED precautions discussed with patient.  -Decreased coffee intake  -     ELECTROCARDIOGRAM, COMPLETE  -     CBC, Platelet; No Differential; Future; Expected date: 02/07/2024  -     Comp Metabolic Panel (14); Future; Expected date: 02/07/2024  -     Assay, Thyroid Stim Hormone; Future; Expected date: 02/07/2024  -     Free T4, (Free Thyroxine); Future; Expected date: 02/07/2024  -     CARD ECHO 2D DOPPLER (CPT=93306); Future; Expected date: 02/07/2024  -     CARD MONITOR HOLTER 48 HOUR (CPT=93225); Future; Expected date: 02/07/2024  2. Dizziness  As above.  -     ELECTROCARDIOGRAM, COMPLETE          Return in 3 weeks (on 2/28/2024), or if symptoms worsen or fail to improve.    Jordy Rushing MD, 2/7/2024, 12:02 PM

## 2024-02-09 ENCOUNTER — HOSPITAL ENCOUNTER (OUTPATIENT)
Dept: CV DIAGNOSTICS | Facility: HOSPITAL | Age: 64
Discharge: HOME | End: 2024-02-09
Attending: STUDENT IN AN ORGANIZED HEALTH CARE EDUCATION/TRAINING PROGRAM
Payer: COMMERCIAL

## 2024-02-09 DIAGNOSIS — R00.2 PALPITATIONS: ICD-10-CM

## 2024-02-09 PROCEDURE — 93225 XTRNL ECG REC<48 HRS REC: CPT | Performed by: STUDENT IN AN ORGANIZED HEALTH CARE EDUCATION/TRAINING PROGRAM

## 2024-02-09 PROCEDURE — 93226 XTRNL ECG REC<48 HR SCAN A/R: CPT | Performed by: STUDENT IN AN ORGANIZED HEALTH CARE EDUCATION/TRAINING PROGRAM

## 2024-02-09 PROCEDURE — 93227 XTRNL ECG REC<48 HR R&I: CPT | Performed by: STUDENT IN AN ORGANIZED HEALTH CARE EDUCATION/TRAINING PROGRAM

## 2024-02-16 ENCOUNTER — LAB ENCOUNTER (OUTPATIENT)
Dept: LAB | Age: 64
End: 2024-02-16
Attending: STUDENT IN AN ORGANIZED HEALTH CARE EDUCATION/TRAINING PROGRAM
Payer: COMMERCIAL

## 2024-02-16 DIAGNOSIS — R00.2 PALPITATIONS: ICD-10-CM

## 2024-02-16 LAB
ALBUMIN SERPL-MCNC: 3.7 G/DL (ref 3.4–5)
ALBUMIN/GLOB SERPL: 1.2 {RATIO} (ref 1–2)
ALP LIVER SERPL-CCNC: 76 U/L
ALT SERPL-CCNC: 27 U/L
ANION GAP SERPL CALC-SCNC: 2 MMOL/L (ref 0–18)
AST SERPL-CCNC: 17 U/L (ref 15–37)
BILIRUB SERPL-MCNC: 0.7 MG/DL (ref 0.1–2)
BUN BLD-MCNC: 16 MG/DL (ref 9–23)
CALCIUM BLD-MCNC: 9.2 MG/DL (ref 8.5–10.1)
CHLORIDE SERPL-SCNC: 107 MMOL/L (ref 98–112)
CO2 SERPL-SCNC: 30 MMOL/L (ref 21–32)
CREAT BLD-MCNC: 0.89 MG/DL
EGFRCR SERPLBLD CKD-EPI 2021: 72 ML/MIN/1.73M2 (ref 60–?)
ERYTHROCYTE [DISTWIDTH] IN BLOOD BY AUTOMATED COUNT: 11.9 %
FASTING STATUS PATIENT QL REPORTED: YES
GLOBULIN PLAS-MCNC: 3.2 G/DL (ref 2.8–4.4)
GLUCOSE BLD-MCNC: 94 MG/DL (ref 70–99)
HCT VFR BLD AUTO: 41.4 %
HGB BLD-MCNC: 14.2 G/DL
MCH RBC QN AUTO: 32.6 PG (ref 26–34)
MCHC RBC AUTO-ENTMCNC: 34.3 G/DL (ref 31–37)
MCV RBC AUTO: 95.2 FL
OSMOLALITY SERPL CALC.SUM OF ELEC: 289 MOSM/KG (ref 275–295)
PLATELET # BLD AUTO: 260 10(3)UL (ref 150–450)
POTASSIUM SERPL-SCNC: 4 MMOL/L (ref 3.5–5.1)
PROT SERPL-MCNC: 6.9 G/DL (ref 6.4–8.2)
RBC # BLD AUTO: 4.35 X10(6)UL
SODIUM SERPL-SCNC: 139 MMOL/L (ref 136–145)
T4 FREE SERPL-MCNC: 0.9 NG/DL (ref 0.8–1.7)
TSI SER-ACNC: 0.45 MIU/ML (ref 0.36–3.74)
WBC # BLD AUTO: 4.7 X10(3) UL (ref 4–11)

## 2024-02-16 PROCEDURE — 84439 ASSAY OF FREE THYROXINE: CPT

## 2024-02-16 PROCEDURE — 84443 ASSAY THYROID STIM HORMONE: CPT

## 2024-02-16 PROCEDURE — 85027 COMPLETE CBC AUTOMATED: CPT

## 2024-02-16 PROCEDURE — 36415 COLL VENOUS BLD VENIPUNCTURE: CPT

## 2024-02-16 PROCEDURE — 80053 COMPREHEN METABOLIC PANEL: CPT

## 2024-02-20 ENCOUNTER — HOSPITAL ENCOUNTER (OUTPATIENT)
Dept: CV DIAGNOSTICS | Facility: HOSPITAL | Age: 64
Discharge: HOME | End: 2024-02-20
Attending: STUDENT IN AN ORGANIZED HEALTH CARE EDUCATION/TRAINING PROGRAM
Payer: COMMERCIAL

## 2024-02-20 DIAGNOSIS — R00.2 PALPITATIONS: ICD-10-CM

## 2024-02-20 PROCEDURE — 93306 TTE W/DOPPLER COMPLETE: CPT | Performed by: STUDENT IN AN ORGANIZED HEALTH CARE EDUCATION/TRAINING PROGRAM

## 2024-02-22 DIAGNOSIS — I34.1: Primary | ICD-10-CM

## 2024-02-27 ENCOUNTER — TELEMEDICINE (OUTPATIENT)
Dept: FAMILY MEDICINE CLINIC | Facility: CLINIC | Age: 64
End: 2024-02-27
Payer: COMMERCIAL

## 2024-02-27 DIAGNOSIS — R05.1 ACUTE COUGH: Primary | ICD-10-CM

## 2024-02-27 PROCEDURE — 99213 OFFICE O/P EST LOW 20 MIN: CPT | Performed by: STUDENT IN AN ORGANIZED HEALTH CARE EDUCATION/TRAINING PROGRAM

## 2024-02-27 RX ORDER — BENZONATATE 100 MG/1
100 CAPSULE ORAL 3 TIMES DAILY PRN
Qty: 20 CAPSULE | Refills: 0 | Status: SHIPPED | OUTPATIENT
Start: 2024-02-27 | End: 2024-03-05

## 2024-02-27 RX ORDER — METHYLPREDNISOLONE 4 MG/1
TABLET ORAL
Qty: 21 EACH | Refills: 0 | Status: SHIPPED | OUTPATIENT
Start: 2024-02-27

## 2024-02-27 NOTE — PROGRESS NOTES
This is a telemedicine visit with live, interactive video and audio.     Patient understands and accepts financial responsibility for any deductible, co-insurance and/or co-pays associated with this service.    SUBJECTIVE    63-year-old female complaining of a dry cough that started 5 days ago.  States that 2 days ago had a temperature of 99.8 F with muscle aches and progression of the cough to a deep cough that became sometimes productive.  Took Advil with some relief.  Did a home COVID test this morning that was negative.  Has an albuterol inhaler from her previous office visit that she used last night with good improvement.  Denies chest pain, SOB, difficulty breathing, sore throat.    HISTORY:  Past Medical History:   Diagnosis Date    Alcoholism (HCC)     Capsulitis of left foot 2016    Cerebral aneurysm without rupture (HCC) 2010    Cerebral aneurysm without rupture (HCC)     DEPRESSION     Ectopic pregnancy x2    x2    Mitral valve prolapse     Panic attacks       Past Surgical History:   Procedure Laterality Date    COLONOSCOPY,DIAGNOSTIC  2012    Procedure: COLONOSCOPY, POSSIBLE BIOPSY, POSSIBLE POLYPECTOMY 58477;  Surgeon: Toni Galeano MD;  Location: [facility]    HYSTERECTOMY      partial hysterectomy, has one ovary she doesn't remeber which                      OTHER SURGICAL HISTORY  2010    L cerebral aneurysmal repair, at [facility]      Family History   Problem Relation Age of Onset    Alcohol and Other Disorders Associated Father     Cancer Father         lung    Other (Other) Father         COPD    Cancer Mother     Diabetes Mother     Heart Disorder Mother     Psychiatric Mother         alzheimers    Substance Abuse Son     Cancer Maternal Grandmother         colon CA    Heart Disorder Maternal Grandfather         MI    Cancer Paternal Grandfather         colon CA    Cancer Sister         thyroid    Schizophrenia Brother     Cancer Brother          GB    Alcohol and Other Disorders Associated Brother     Suicide History Niece       Social History     Socioeconomic History    Marital status:     Number of children: 3   Occupational History    Occupation: LPN   Tobacco Use    Smoking status: Every Day     Packs/day: 0.50     Years: 30.00     Additional pack years: 0.00     Total pack years: 15.00     Types: Cigarettes     Last attempt to quit: 6/27/2020     Years since quitting: 3.6    Smokeless tobacco: Never    Tobacco comments:     2-3 cig a day   Vaping Use    Vaping Use: Never used   Substance and Sexual Activity    Alcohol use: Yes     Alcohol/week: 8.0 standard drinks of alcohol     Types: 8 Standard drinks or equivalent per week     Comment: 1 pint daily    Drug use: Not Currently     Types: Cannabis    Sexual activity: Not Currently     Partners: Male   Other Topics Concern     Service No    Blood Transfusions Yes     Comment: with ectopic 1981    Exercise Yes    Seat Belt Yes    Self-Exams No     Comment: discussed        Allergies   Allergen Reactions    Ciprofloxacin HIVES    Flagyl [Metronidazole Hcl] DIARRHEA and NAUSEA AND VOMITING     GI INTOLERANCE    Tylox [Oxycodone-Acetaminophen] RASH    Opioid Analgesics NAUSEA AND VOMITING    Grass Extracts [Gramineae Pollens] UNKNOWN    Influenza Vaccines OTHER (SEE COMMENTS)     Numbness extremities.    Morphine       Current Outpatient Medications   Medication Sig Dispense Refill    methylPREDNISolone (MEDROL) 4 MG Oral Tablet Therapy Pack As directed. 21 each 0    benzonatate 100 MG Oral Cap Take 1 capsule (100 mg total) by mouth 3 (three) times daily as needed for cough. 20 capsule 0    propranolol 20 MG Oral Tab Take 1 tablet (20 mg total) by mouth 2 (two) times daily. 60 tablet 0    buPROPion  MG Oral Tablet 24 Hr Take 1 tablet (300 mg total) by mouth daily. 30 tablet 0    gabapentin 300 MG Oral Cap Take 1 capsule (300 mg total) by mouth 3 (three) times daily. 90 capsule 0     disulfiram 250 MG Oral Tab Take 1 tablet (250 mg total) by mouth daily. 30 tablet 0    albuterol 108 (90 Base) MCG/ACT Inhalation Aero Soln Inhale 2 puffs into the lungs every 6 (six) hours as needed for Shortness of Breath.      Cholecalciferol (VITAMIN D) 50 MCG (2000 UT) Oral Tab Take 1 tablet by mouth.         OBJECTIVE  Physical Exam:   alert, appears stated age, cooperative, and no distress, Speaking in full sentences comfortably, and Normal work of breathing    ASSESSMENT & PLAN      1. Acute cough (Primary)  -Supportive care and medications as below.  -Advised patient about signs and symptoms that would prompt follow-up.  -     methylPREDNISolone; As directed.  Dispense: 21 each; Refill: 0  -     Benzonatate; Take 1 capsule (100 mg total) by mouth 3 (three) times daily as needed for cough.  Dispense: 20 capsule; Refill: 0        Return if symptoms worsen or fail to improve.    Jordy Rushing MD

## 2024-03-14 RX ORDER — PROPRANOLOL HYDROCHLORIDE 20 MG/1
20 TABLET ORAL 2 TIMES DAILY
Qty: 60 TABLET | Refills: 0 | OUTPATIENT
Start: 2024-03-14

## 2024-03-14 RX ORDER — DISULFIRAM 250 MG/1
250 TABLET ORAL DAILY
Qty: 30 TABLET | Refills: 0 | OUTPATIENT
Start: 2024-03-14

## 2024-03-14 RX ORDER — BUPROPION HYDROCHLORIDE 300 MG/1
300 TABLET ORAL DAILY
Qty: 30 TABLET | Refills: 0 | OUTPATIENT
Start: 2024-03-14

## 2024-03-14 NOTE — TELEPHONE ENCOUNTER
IA:  Please reach out to patient to determine if refills were requested in error; per chart, patient discharged from Dr. Torres's care on 9/23 with instructions to follow up with outside provider on 10/11.    Received MyChart:  propranolol 20 MG Oral Tab [Matthew Torres]         buPROPion  MG Oral Tablet 24 Hr [Matthew Torres]      Patient Comment: The correct dose is 150 mg once daily         disulfiram 250 MG Oral Tab [Matthew oTrres]      Patient Comment: Correct dose is 0.5 tab once daily

## 2024-03-25 PROBLEM — I34.1 MVP (MITRAL VALVE PROLAPSE): Status: ACTIVE | Noted: 2017-08-30

## 2024-03-25 PROBLEM — F10.90 ALCOHOL USE DISORDER: Status: RESOLVED | Noted: 2023-09-24 | Resolved: 2024-03-25

## 2024-03-25 PROBLEM — I67.1: Status: ACTIVE | Noted: 2024-03-25

## 2024-03-25 PROBLEM — H52.4 PRESBYOPIA: Status: ACTIVE | Noted: 2019-07-15

## 2024-03-25 PROBLEM — F10.21 CHRONIC ALCOHOLISM IN REMISSION  (CMD): Status: ACTIVE | Noted: 2024-03-25

## 2024-03-25 PROBLEM — Z86.79 HISTORY OF INTRACRANIAL ANEURYSM: Status: ACTIVE | Noted: 2023-12-28

## 2024-03-25 PROBLEM — H52.13 MYOPIA, BILATERAL: Status: ACTIVE | Noted: 2019-07-15

## 2024-03-25 PROBLEM — F17.200 TOBACCO DEPENDENCE: Status: ACTIVE | Noted: 2019-04-26

## 2024-03-25 PROBLEM — F10.90 ALCOHOL USE DISORDER: Status: ACTIVE | Noted: 2023-09-24

## 2024-03-25 PROBLEM — G25.0 ESSENTIAL TREMOR: Status: ACTIVE | Noted: 2023-12-28

## 2024-03-25 PROBLEM — F32.A DEPRESSION: Status: ACTIVE | Noted: 2019-04-26

## 2024-03-25 PROBLEM — K76.0 FATTY LIVER: Status: ACTIVE | Noted: 2017-11-06

## 2024-03-25 PROBLEM — F41.1 GENERALIZED ANXIETY DISORDER: Status: ACTIVE | Noted: 2019-04-26

## 2024-03-25 PROBLEM — H52.229 REGULAR ASTIGMATISM: Status: ACTIVE | Noted: 2019-07-15

## 2024-03-25 PROBLEM — Z76.89 ESTABLISHING CARE WITH NEW DOCTOR, ENCOUNTER FOR: Status: ACTIVE | Noted: 2024-03-25

## 2024-06-14 ENCOUNTER — PATIENT OUTREACH (OUTPATIENT)
Dept: CASE MANAGEMENT | Age: 64
End: 2024-06-14

## 2024-06-14 NOTE — PROCEDURES
The office order for PCP removal request is Approved and finalized on June 14, 2024.    Thanks,  [facility] Team

## 2024-10-17 ENCOUNTER — LAB SERVICES (OUTPATIENT)
Dept: LAB | Age: 64
End: 2024-10-17

## 2024-10-17 ENCOUNTER — HOSPITAL ENCOUNTER (OUTPATIENT)
Dept: CT IMAGING | Age: 64
Discharge: HOME | End: 2024-10-17
Attending: INTERNAL MEDICINE

## 2024-10-17 ENCOUNTER — CLINICAL DOCUMENTATION (OUTPATIENT)
Dept: CT IMAGING | Age: 64
End: 2024-10-17

## 2024-10-17 DIAGNOSIS — Z12.31 ENCOUNTER FOR SCREENING MAMMOGRAM FOR MALIGNANT NEOPLASM OF BREAST: ICD-10-CM

## 2024-10-17 DIAGNOSIS — R53.83 FATIGUE, UNSPECIFIED TYPE: ICD-10-CM

## 2024-10-17 DIAGNOSIS — Z83.3 FAMILY HISTORY OF DIABETES MELLITUS (DM): ICD-10-CM

## 2024-10-17 DIAGNOSIS — E78.2 MIXED HYPERLIPIDEMIA: ICD-10-CM

## 2024-10-17 DIAGNOSIS — M81.0 AGE-RELATED OSTEOPOROSIS WITHOUT CURRENT PATHOLOGICAL FRACTURE: ICD-10-CM

## 2024-10-17 DIAGNOSIS — Z11.59 NEED FOR HEPATITIS C SCREENING TEST: ICD-10-CM

## 2024-10-17 DIAGNOSIS — Z87.891 PERSONAL HISTORY OF TOBACCO USE, PRESENTING HAZARDS TO HEALTH: ICD-10-CM

## 2024-10-17 LAB
ALBUMIN SERPL-MCNC: 3.9 G/DL (ref 3.4–5)
ALBUMIN/GLOB SERPL: 1.2 {RATIO} (ref 1–2.4)
ALP SERPL-CCNC: 80 UNITS/L (ref 45–117)
ALT SERPL-CCNC: 24 UNITS/L
ANION GAP SERPL CALC-SCNC: 9 MMOL/L (ref 7–19)
AST SERPL-CCNC: 15 UNITS/L
BASOPHILS # BLD: 0 K/MCL (ref 0–0.3)
BASOPHILS NFR BLD: 1 %
BILIRUB SERPL-MCNC: 1 MG/DL (ref 0.2–1)
BUN SERPL-MCNC: 12 MG/DL (ref 6–20)
BUN/CREAT SERPL: 17 (ref 7–25)
CALCIUM SERPL-MCNC: 9.4 MG/DL (ref 8.4–10.2)
CHLORIDE SERPL-SCNC: 108 MMOL/L (ref 97–110)
CHOLEST SERPL-MCNC: 208 MG/DL
CHOLEST/HDLC SERPL: 2.3 {RATIO}
CO2 SERPL-SCNC: 27 MMOL/L (ref 21–32)
CREAT SERPL-MCNC: 0.72 MG/DL (ref 0.51–0.95)
DEPRECATED RDW RBC: 42.8 FL (ref 39–50)
EGFRCR SERPLBLD CKD-EPI 2021: >90 ML/MIN/{1.73_M2}
EOSINOPHIL # BLD: 0.1 K/MCL (ref 0–0.5)
EOSINOPHIL NFR BLD: 1 %
ERYTHROCYTE [DISTWIDTH] IN BLOOD: 12 % (ref 11–15)
FASTING DURATION TIME PATIENT: NORMAL H
GLOBULIN SER-MCNC: 3.3 G/DL (ref 2–4)
GLUCOSE SERPL-MCNC: 96 MG/DL (ref 70–99)
HBA1C MFR BLD: 5.3 % (ref 4.5–5.6)
HCT VFR BLD CALC: 43.3 % (ref 36–46.5)
HCV AB SER QL: NEGATIVE
HDLC SERPL-MCNC: 91 MG/DL
HGB BLD-MCNC: 14.7 G/DL (ref 12–15.5)
IMM GRANULOCYTES # BLD AUTO: 0 K/MCL (ref 0–0.2)
IMM GRANULOCYTES # BLD: 0 %
LDLC SERPL CALC-MCNC: 106 MG/DL
LYMPHOCYTES # BLD: 1.2 K/MCL (ref 1–4)
LYMPHOCYTES NFR BLD: 25 %
MCH RBC QN AUTO: 33.1 PG (ref 26–34)
MCHC RBC AUTO-ENTMCNC: 33.9 G/DL (ref 32–36.5)
MCV RBC AUTO: 97.5 FL (ref 78–100)
MONOCYTES # BLD: 0.5 K/MCL (ref 0.3–0.9)
MONOCYTES NFR BLD: 10 %
NEUTROPHILS # BLD: 2.9 K/MCL (ref 1.8–7.7)
NEUTROPHILS NFR BLD: 63 %
NONHDLC SERPL-MCNC: 117 MG/DL
NRBC BLD MANUAL-RTO: 0 /100 WBC
PLATELET # BLD AUTO: 244 K/MCL (ref 140–450)
POTASSIUM SERPL-SCNC: 3.9 MMOL/L (ref 3.4–5.1)
PROT SERPL-MCNC: 7.2 G/DL (ref 6.4–8.2)
RBC # BLD: 4.44 MIL/MCL (ref 4–5.2)
SODIUM SERPL-SCNC: 140 MMOL/L (ref 135–145)
TRIGL SERPL-MCNC: 55 MG/DL
TSH SERPL-ACNC: 0.53 MCUNITS/ML (ref 0.35–5)
WBC # BLD: 4.7 K/MCL (ref 4.2–11)

## 2024-10-17 PROCEDURE — 83036 HEMOGLOBIN GLYCOSYLATED A1C: CPT | Performed by: CLINICAL MEDICAL LABORATORY

## 2024-10-17 PROCEDURE — 80061 LIPID PANEL: CPT | Performed by: CLINICAL MEDICAL LABORATORY

## 2024-10-17 PROCEDURE — 77063 BREAST TOMOSYNTHESIS BI: CPT

## 2024-10-17 PROCEDURE — 80050 GENERAL HEALTH PANEL: CPT | Performed by: CLINICAL MEDICAL LABORATORY

## 2024-10-17 PROCEDURE — 77080 DXA BONE DENSITY AXIAL: CPT

## 2024-10-17 PROCEDURE — 86803 HEPATITIS C AB TEST: CPT | Performed by: CLINICAL MEDICAL LABORATORY

## 2024-10-17 PROCEDURE — 71271 CT THORAX LUNG CANCER SCR C-: CPT

## 2024-10-17 PROCEDURE — 36415 COLL VENOUS BLD VENIPUNCTURE: CPT | Performed by: CLINICAL MEDICAL LABORATORY

## 2024-11-26 PROBLEM — N95.0 POSTMENOPAUSAL BLEEDING: Status: ACTIVE | Noted: 2024-11-26

## 2025-02-04 PROBLEM — N76.0 BACTERIAL VAGINOSIS: Status: ACTIVE | Noted: 2025-02-04

## 2025-02-04 PROBLEM — B96.89 BACTERIAL VAGINOSIS: Status: ACTIVE | Noted: 2025-02-04

## 2025-04-02 ENCOUNTER — OFFICE VISIT (OUTPATIENT)
Dept: OCCUPATIONAL MEDICINE | Age: 65
End: 2025-04-02
Attending: NURSE PRACTITIONER

## 2025-04-02 DIAGNOSIS — Z02.89 VISIT FOR OCCUPATIONAL HEALTH EXAMINATION: Primary | ICD-10-CM

## 2025-04-02 PROCEDURE — 86480 TB TEST CELL IMMUN MEASURE: CPT

## 2025-04-04 LAB
M TB IFN-G CD4+ T-CELLS BLD-ACNC: 0.03 IU/ML
M TB TUBERC IFN-G BLD QL: NEGATIVE
M TB TUBERC IGNF/MITOGEN IGNF CONTROL: 9.51 IU/ML
QFT TB1 AG MINUS NIL: 0.07 IU/ML
QFT TB2 AG MINUS NIL: 0.12 IU/ML

## 2025-04-06 ENCOUNTER — HOSPITAL ENCOUNTER (OUTPATIENT)
Age: 65
Discharge: HOME | End: 2025-04-06
Payer: COMMERCIAL

## 2025-04-06 VITALS
TEMPERATURE: 98 F | OXYGEN SATURATION: 99 % | HEART RATE: 70 BPM | RESPIRATION RATE: 20 BRPM | DIASTOLIC BLOOD PRESSURE: 78 MMHG | SYSTOLIC BLOOD PRESSURE: 122 MMHG

## 2025-04-06 DIAGNOSIS — N30.00 ACUTE CYSTITIS WITHOUT HEMATURIA: ICD-10-CM

## 2025-04-06 DIAGNOSIS — R30.0 DYSURIA: Primary | ICD-10-CM

## 2025-04-06 LAB
BILIRUB UR QL STRIP: NEGATIVE
COLOR UR: YELLOW
GLUCOSE UR STRIP-MCNC: NEGATIVE MG/DL
HGB UR QL STRIP: NEGATIVE
KETONES UR STRIP-MCNC: NEGATIVE MG/DL
LEUKOCYTE ESTERASE UR QL STRIP: NEGATIVE
NITRITE UR QL STRIP: NEGATIVE
PH UR STRIP: 6.5 [PH]
PROT UR STRIP-MCNC: NEGATIVE MG/DL
SP GR UR STRIP: 1.01
UROBILINOGEN UR STRIP-ACNC: <2 MG/DL

## 2025-04-06 PROCEDURE — 99214 OFFICE O/P EST MOD 30 MIN: CPT | Performed by: NURSE PRACTITIONER

## 2025-04-06 PROCEDURE — 81002 URINALYSIS NONAUTO W/O SCOPE: CPT | Performed by: NURSE PRACTITIONER

## 2025-04-06 RX ORDER — CEPHALEXIN 500 MG/1
500 CAPSULE ORAL 2 TIMES DAILY
Qty: 14 CAPSULE | Refills: 0 | Status: SHIPPED | OUTPATIENT
Start: 2025-04-06 | End: 2025-04-13

## 2025-04-06 NOTE — ED PROVIDER NOTES
Patient Seen in: [facility]      History     Chief Complaint   Patient presents with    Urinary Symptoms     Stated Complaint: fever, pressure pain, uti symptoms  Subjective:   64-year-old female with anxiety, previous alcoholism presents from home.  Patient is here with concern for UTI.  States 3 days ago while at work she started having abdominal pain.  Subsequently developed dysuria and bladder pressure.  Strong smell to her urine.  States fever was 100 yesterday.  Some nausea, no vomiting.  She does note that the abdominal pain has improved.  She did take Azo and 4 doses of leftover ampicillin.  No history of recurrent UTI.  Denies history of STD.  No history of renal stone.  States she has been sober for many years.    The history is provided by the patient. No  was used.     Objective:   No pertinent past medical history.        HISTORY:  Past Medical History:    Alcoholism (HCC)    Capsulitis of left foot    Cerebral aneurysm without rupture (HCC)    Cerebral aneurysm without rupture (HCC)    DEPRESSION    Ectopic pregnancy    x2    Mitral valve prolapse    Panic attacks      Past Surgical History:   Procedure Laterality Date    Colonoscopy,diagnostic  2012    Procedure: COLONOSCOPY, POSSIBLE BIOPSY, POSSIBLE POLYPECTOMY 46079;  Surgeon: Toni Galeano MD;  [facility]    Hysterectomy      partial hysterectomy, has one ovary she doesn't remeber which                      Other surgical history  2010    L cerebral aneurysmal repair, at [facility]      Family History   Problem Relation Age of Onset    Alcohol and Other Disorders Associated Father     Cancer Father         lung    Other (Other) Father         COPD    Cancer Mother     Diabetes Mother     Heart Disorder Mother     Psychiatric Mother         alzheimers    Substance Abuse Son     Cancer Maternal Grandmother         colon CA    Heart Disorder Maternal  Grandfather         MI    Cancer Paternal Grandfather         colon CA    Cancer Sister         thyroid    Schizophrenia Brother     Cancer Brother         GB    Alcohol and Other Disorders Associated Brother     Suicide History Niece       Social History     Socioeconomic History    Marital status:     Number of children: 3   Occupational History    Occupation: LPN   Tobacco Use    Smoking status: Every Day     Current packs/day: 0.00     Average packs/day: 0.5 packs/day for 30.0 years (15.0 ttl pk-yrs)     Types: Cigarettes     Start date: 1990     Last attempt to quit: 2020     Years since quittin.7    Smokeless tobacco: Never    Tobacco comments:     2-3 cig a day   Vaping Use    Vaping status: Never Used   Substance and Sexual Activity    Alcohol use: Yes     Alcohol/week: 8.0 standard drinks of alcohol     Types: 8 Standard drinks or equivalent per week     Comment: 1 pint daily    Drug use: Not Currently     Types: Cannabis    Sexual activity: Not Currently     Partners: Male   Other Topics Concern     Service No    Blood Transfusions Yes     Comment: with ectopic 1981    Exercise Yes    Seat Belt Yes    Self-Exams No     Comment: discussed     Social Drivers of Health      [place] [place]    [facility]          No pertinent past surgical history.            No pertinent social history.          Review of Systems    Positive for stated complaint: Urinary Symptoms    Other systems are as noted in HPI.  Constitutional and vital signs reviewed.      All other systems reviewed and negative except as noted above.    Physical Exam     ED Triage Vitals [25 1323]   /78   Pulse 70   Resp 20   Temp 98.2 °F (36.8 °C)   Temp src Oral   SpO2 99 %   O2 Device None (Room air)     Current:/78   Pulse 70   Temp 98.2 °F (36.8 °C) (Oral)   Resp 20   SpO2 99%     Physical Exam  Vitals and nursing note reviewed.   Constitutional:       General: She is  not in acute distress.     Appearance: Normal appearance. She is not ill-appearing or toxic-appearing.   HENT:      Head: Normocephalic and atraumatic.      Nose: Nose normal.      Mouth/Throat:      Mouth: Mucous membranes are moist.      Pharynx: Oropharynx is clear.   Eyes:      Pupils: Pupils are equal, round, and reactive to light.   Cardiovascular:      Rate and Rhythm: Normal rate and regular rhythm.      Pulses: Normal pulses.   Pulmonary:      Effort: Pulmonary effort is normal. No respiratory distress.      Breath sounds: Normal breath sounds.      Comments: Lungs clear.  No adventitious lung sounds.  No distress.  No hypoxia.  Pulse ox 99% ra. Which is normal    Abdominal:      General: Abdomen is flat.      Palpations: Abdomen is soft.      Tenderness: There is no abdominal tenderness. There is no right CVA tenderness or left CVA tenderness.      Comments: Some pressure and discomfort over her bladder but no specific point tenderness or guarding   Musculoskeletal:         General: No signs of injury. Normal range of motion.      Cervical back: Normal range of motion and neck supple.   Skin:     General: Skin is warm and dry.      Capillary Refill: Capillary refill takes less than 2 seconds.   Neurological:      General: No focal deficit present.      Mental Status: She is alert and oriented to person, place, and time.      GCS: GCS eye subscore is 4. GCS verbal subscore is 5. GCS motor subscore is 6.   Psychiatric:         Mood and Affect: Mood normal.         Behavior: Behavior normal.         Thought Content: Thought content normal.         Judgment: Judgment normal.         ED Course   Radiology:  No results found.  Labs Reviewed   [facility] POCT URINALYSIS DIPSTICK - Abnormal; Notable for the following components:       Result Value    Urine Clarity Slightly cloudy (*)     All other components within normal limits   URINE CULTURE, ROUTINE     Recent Results (from the past 24 hours)   POCT Urinalysis  Dipstick    Collection Time: 04/06/25  1:40 PM   Result Value Ref Range    Urine Color Yellow Yellow    Urine Clarity Slightly cloudy (A) Clear    Specific Gravity, Urine 1.015 1.005 - 1.030    PH, Urine 6.5 5.0 - 8.0    Protein urine Negative Negative mg/dL    Glucose, Urine Negative Negative mg/dL    Ketone, Urine Negative Negative mg/dL    Bilirubin, Urine Negative Negative    Blood, Urine Negative Negative    Nitrite Urine Negative Negative    Urobilinogen urine <2.0 <2.0 mg/dL    Leukocyte esterase urine Negative Negative       MDM     Medical Decision Making  Differential diagnoses reflecting the complexity of care include: UTI, pyelonephritis, renal stone  Patient is well-appearing.  Afebrile here.  No tachycardia.  No hypotension.  No acute abdominal or CVA tenderness.  UA here does not show infection.  No leuks or nitrites.  No blood.  Urine culture was sent.  No previous urine culture on file to review.  Denies STD risk  Not consistent with pyelonephritis or renal stone   Shared decision making with patient. She has taken 4 doses of ampicillin.  Discussed possibility of partially treated UTI.  She does note that she is having specific UTI symptoms.  She would like to take antibiotics pending the urine culture.    Plan of Care: Rx Keflex.  Stop ampicillin.  Tylenol or Motrin as needed for pain.  Increase water intake.  Needs close follow-up with primary doctor.  Patient was encouraged to go the emergency room if worsening pain, persistent fever, vomiting    Results and plan of care discussed with the patient/family. They are in agreement with discharge. They understand to follow up with their primary doctor or the referral physician for further evaluation, especially if no improvement.  Also discussed the limitations of immediate care, patient is aware that if symptoms are worse they should go to the emergency room. Verbal and written discharge instructions were given.     My independent interpretation of  studies of: UA without obvious infection  Diagnostic tests and medications considered but not ordered were: No indication for labs or imaging today  Shared decision making was done by: Patient would like to take antibiotics for presumed UTI pending urine culture  Comorbidities that add complexity to management include: anxiety  External chart review was done and was noted: No previous urine culture on chart.  Previously seen for BV  History obtained by an independent source was from: N/A  Discussions and management was done with: N/A  Social determinants of health that affect care: N/A              Problems Addressed:  Acute cystitis without hematuria: acute illness or injury  Dysuria: acute illness or injury    Amount and/or Complexity of Data Reviewed  Labs: ordered. Decision-making details documented in ED Course.    Risk  OTC drugs.  Prescription drug management.        Disposition and Plan     Clinical Impression:  1. Dysuria    2. Acute cystitis without hematuria         Disposition:  Discharge  4/6/2025  1:44 pm    Follow-up:  Liana Chase MD  [address] [facility] [address]  [phone]                Medications Prescribed:  Discharge Medication List as of 4/6/2025  1:44 PM        START taking these medications    Details   cephALEXin 500 MG Oral Cap Take 1 capsule (500 mg total) by mouth 2 (two) times daily for 7 days., Normal, Disp-14 capsule, R-0

## 2025-04-07 ENCOUNTER — HOSPITAL ENCOUNTER (EMERGENCY)
Age: 65
Discharge: HOME | End: 2025-04-07

## 2025-04-07 VITALS
OXYGEN SATURATION: 95 % | DIASTOLIC BLOOD PRESSURE: 83 MMHG | RESPIRATION RATE: 20 BRPM | BODY MASS INDEX: 26.43 KG/M2 | WEIGHT: 164.46 LBS | HEIGHT: 66 IN | TEMPERATURE: 97.7 F | SYSTOLIC BLOOD PRESSURE: 118 MMHG | HEART RATE: 72 BPM

## 2025-04-07 LAB
APPEARANCE UR: CLEAR
BACTERIA #/AREA URNS HPF: NORMAL /HPF
BILIRUB UR QL STRIP: NEGATIVE
COLOR UR: COLORLESS
GLUCOSE UR STRIP-MCNC: NEGATIVE MG/DL
HGB UR QL STRIP: NEGATIVE
HYALINE CASTS #/AREA URNS LPF: NORMAL /LPF
KETONES UR STRIP-MCNC: NEGATIVE MG/DL
LEUKOCYTE ESTERASE UR QL STRIP: NEGATIVE
NITRITE UR QL STRIP: NEGATIVE
PH UR STRIP: 6.5 [PH] (ref 5–7)
PROT UR STRIP-MCNC: NEGATIVE MG/DL
RBC #/AREA URNS HPF: NORMAL /HPF
SP GR UR STRIP: 1.01 (ref 1–1.03)
SQUAMOUS #/AREA URNS HPF: NORMAL /HPF
UROBILINOGEN UR STRIP-MCNC: 0.2 MG/DL
WBC #/AREA URNS HPF: NORMAL /HPF

## 2025-04-07 PROCEDURE — 10003627 HB COUNTER ED NO SERVICE

## 2025-04-07 PROCEDURE — 81001 URINALYSIS AUTO W/SCOPE: CPT | Performed by: EMERGENCY MEDICINE

## 2025-04-07 SDOH — SOCIAL STABILITY: SOCIAL INSECURITY: HOW OFTEN DOES ANYONE, INCLUDING FAMILY AND FRIENDS, THREATEN YOU WITH HARM?: NEVER

## 2025-04-07 SDOH — SOCIAL STABILITY: SOCIAL INSECURITY: HOW OFTEN DOES ANYONE, INCLUDING FAMILY AND FRIENDS, SCREAM OR CURSE AT YOU?: NEVER

## 2025-04-07 SDOH — SOCIAL STABILITY: SOCIAL INSECURITY: HOW OFTEN DOES ANYONE, INCLUDING FAMILY AND FRIENDS, PHYSICALLY HURT YOU?: NEVER

## 2025-04-07 SDOH — SOCIAL STABILITY: SOCIAL INSECURITY: HOW OFTEN DOES ANYONE, INCLUDING FAMILY AND FRIENDS, INSULT OR TALK DOWN TO YOU?: NEVER

## 2025-04-07 ASSESSMENT — PAIN SCALES - GENERAL: PAINLEVEL_OUTOF10: 4

## 2025-04-08 NOTE — ED NOTES
Discussed final urine culture result with pt. Pt states she's still having symptoms. Pt advised to follow-up with PMD.

## 2025-04-10 PROBLEM — R30.0 DYSURIA: Status: ACTIVE | Noted: 2025-04-10

## 2025-04-29 PROBLEM — R31.0 GROSS HEMATURIA: Status: ACTIVE | Noted: 2025-04-29

## 2025-08-13 ENCOUNTER — LAB SERVICES (OUTPATIENT)
Dept: LAB | Age: 65
End: 2025-08-13

## 2025-08-13 DIAGNOSIS — F41.1 GENERALIZED ANXIETY DISORDER: ICD-10-CM

## 2025-08-13 DIAGNOSIS — E78.2 MIXED HYPERLIPIDEMIA: ICD-10-CM

## 2025-08-13 LAB
ALBUMIN SERPL-MCNC: 3.5 G/DL (ref 3.4–5)
ALBUMIN/GLOB SERPL: 1.1 {RATIO} (ref 1–2.4)
ALP SERPL-CCNC: 71 UNITS/L (ref 45–117)
ALT SERPL-CCNC: 27 UNITS/L
ANION GAP SERPL CALC-SCNC: 10 MMOL/L (ref 7–19)
AST SERPL-CCNC: 15 UNITS/L
BASOPHILS # BLD: 0 K/MCL (ref 0–0.3)
BASOPHILS NFR BLD: 1 %
BILIRUB SERPL-MCNC: 0.4 MG/DL (ref 0.2–1)
BUN SERPL-MCNC: 16 MG/DL (ref 6–20)
BUN/CREAT SERPL: 19 (ref 7–25)
CALCIUM SERPL-MCNC: 9 MG/DL (ref 8.4–10.2)
CHLORIDE SERPL-SCNC: 110 MMOL/L (ref 97–110)
CHOLEST SERPL-MCNC: 188 MG/DL
CHOLEST/HDLC SERPL: 2.1 {RATIO}
CO2 SERPL-SCNC: 27 MMOL/L (ref 21–32)
CREAT SERPL-MCNC: 0.83 MG/DL (ref 0.51–0.95)
DEPRECATED RDW RBC: 44.9 FL (ref 39–50)
EGFRCR SERPLBLD CKD-EPI 2021: 78 ML/MIN/{1.73_M2}
EOSINOPHIL # BLD: 0.1 K/MCL (ref 0–0.5)
EOSINOPHIL NFR BLD: 1 %
ERYTHROCYTE [DISTWIDTH] IN BLOOD: 12.4 % (ref 11–15)
FASTING DURATION TIME PATIENT: NORMAL H
GLOBULIN SER-MCNC: 3.3 G/DL (ref 2–4)
GLUCOSE SERPL-MCNC: 88 MG/DL (ref 70–99)
HCT VFR BLD CALC: 41.2 % (ref 36–46.5)
HDLC SERPL-MCNC: 91 MG/DL
HGB BLD-MCNC: 13.8 G/DL (ref 12–15.5)
IMM GRANULOCYTES # BLD AUTO: 0 K/MCL (ref 0–0.2)
IMM GRANULOCYTES # BLD: 0 %
LDLC SERPL CALC-MCNC: 77 MG/DL
LYMPHOCYTES # BLD: 1.2 K/MCL (ref 1–4)
LYMPHOCYTES NFR BLD: 21 %
MCH RBC QN AUTO: 33 PG (ref 26–34)
MCHC RBC AUTO-ENTMCNC: 33.5 G/DL (ref 32–36.5)
MCV RBC AUTO: 98.6 FL (ref 78–100)
MONOCYTES # BLD: 0.6 K/MCL (ref 0.3–0.9)
MONOCYTES NFR BLD: 10 %
NEUTROPHILS # BLD: 3.9 K/MCL (ref 1.8–7.7)
NEUTROPHILS NFR BLD: 67 %
NONHDLC SERPL-MCNC: 97 MG/DL
NRBC BLD MANUAL-RTO: 0 /100 WBC
PLATELET # BLD AUTO: 276 K/MCL (ref 140–450)
POTASSIUM SERPL-SCNC: 3.6 MMOL/L (ref 3.4–5.1)
PROT SERPL-MCNC: 6.8 G/DL (ref 6.4–8.2)
RAINBOW EXTRA TUBES HOLD SPECIMEN: NORMAL
RBC # BLD: 4.18 MIL/MCL (ref 4–5.2)
SODIUM SERPL-SCNC: 143 MMOL/L (ref 135–145)
TRIGL SERPL-MCNC: 98 MG/DL
WBC # BLD: 5.8 K/MCL (ref 4.2–11)

## 2025-08-14 LAB — TSH SERPL-ACNC: 0.31 MCUNITS/ML (ref 0.35–5)

## 2025-08-18 LAB — T4 FREE SERPL-MCNC: 1.2 NG/DL (ref 0.8–1.5)

## 2025-09-07 ENCOUNTER — HOSPITAL ENCOUNTER (EMERGENCY)
Age: 65
End: 2025-09-07

## (undated) NOTE — LETTER
Date & Time: 7/23/2019, 2:27 PM  Patient: Felicita Roman  Encounter Provider(s):    Curt Spence MD       To Whom It May Concern:    Meagan White was seen and treated in our department on 7/23/2019. She can return to work 7/24/19.     If you have an

## (undated) NOTE — LETTER
9/17/2020              Steffany Fletcher        [address]        [facility] [address]         To Whom It May Concern,    I am writing to you in regards to my patient, Tavo Beltran, above.     Steffany had a severe adverse reaction to the Influenza

## (undated) NOTE — ED AVS SNAPSHOT
[facility] Emergency Department    [address] [place] [address]    Phone:  [phone]    Fax:  [address]   MRN: [id number]    Department:  [facility] Emergency Department   Date of Visit:  1/23 IF THERE IS ANY CHANGE OR WORSENING OF YOUR CONDITION, CALL YOUR PRIMARY CARE PHYSICIAN AT ONCE OR RETURN IMMEDIATELY TO THE EMERGENCY DEPARTMENT.     If you have been prescribed any medication(s), please fill your prescription right away and begin taking t

## (undated) NOTE — LETTER
Date & Time: 4/6/2025, 1:44 PM  Patient: Steffany Fletcher  Encounter Provider(s):    Billie Maldonado APRN       To Whom It May Concern:    Steffany Fletcher was seen and treated in our department on 4/6/2025. She can return to work 4/8/25.    If you have any questions or concerns, please do not hesitate to call.        _____________________________  Physician/APC Signature

## (undated) NOTE — LETTER
Date: 6/15/2020    Patient Name: Evie De Souza          To Whom it may concern: This letter has been written at the patient's request and with the patient's express permission to disclose symptom information and test results.  The above patient was see

## (undated) NOTE — LETTER
2/4/2022              Steffany Fletcher        [address], [address]        [facility] [address]         To Whom It May Concern,      I am writing to you in regards to my patient, Sd Denny, above. I evaluated Sd Denny on 2/4/2022, and have diagnosed her with presumed COVID-19 (despite negative tests). Due to her persistent fever and moderate COVID-19 symptoms I am recommending that she remain at home on quarantine through 2/7/2022. She may to return to work without limitations on Tuesday, 2/8/2022 if fever free for 24 hours and only mild symptoms of COVID-19. This release is contingent on her continued recovery. Thank you for your understanding and cooperation in this important medical matter.     Sincerely,    Evans Garcia MD  [address] [address] [phone]        Document electronically generated by:  Evans Garcia MD

## (undated) NOTE — LETTER
Date & Time: 3/20/2020, 9:41 AM  Patient: Cora Cam  Encounter Provider(s):    Heath Encarnacion MD       To Whom It May Concern:    Sonja Mckeon was seen and treated in our department on 3/20/2020.  She is unable to return to work until released by

## (undated) NOTE — LETTER
Date: 3/23/2020    Patient Name: Peter Pro          To Whom it may concern: This letter has been written at the patient's request. The above patient was seen at the [facility] for treatment of a medical condition.     This patient's r

## (undated) NOTE — LETTER
9/27/2019              Steffany Fletcher        [address] [id number] [facility] [address]        [facility] [address]         To Whom It May Concern,    I saw Angelita Silva today for evaluation and have diagnosed her with a self-limited viral upper respiratory infection.  I have clear

## (undated) NOTE — LETTER
Date & Time: 9/9/2022, 12:36 PM  Patient: Mecca Farfan  Encounter Provider(s):    WON Chung       To Whom It May Concern:    Roverto Zuniga was seen and treated in our department on 9/9/2022. She can return to work 09/15/2022 unless otherwise specified by orthopedics.     If you have any questions or concerns, please do not hesitate to call.        _____________________________  Physician/APC Signature

## (undated) NOTE — LETTER
Date & Time: 10/7/2018, 8:03 PM  Patient: Jodi Wells  Encounter Provider(s): More Fountain MD       To Whom It May Concern:    Avon Severance was seen and treated in our department on 10/7/2018. She should not return to work until 10/10/2018.     If

## (undated) NOTE — LETTER
20          Steffany Fletcher  :  10/21/1960      To Whom It May Concern: This patient may return to work effective 2020. Her symptoms are improving.      If this office may be of further assistance, please do not hesitate to contact markos

## (undated) NOTE — ED AVS SNAPSHOT
Janelle Chase   MRN: [id number]    Department:  [facility] Emergency Department   Date of Visit:  10/7/2018           Disclosure     Insurance plans vary and the physician(s) referred by the ER may not be covered by your plan.  Please contact you tell this physician (or your personal doctor if your instructions are to return to your personal doctor) about any new or lasting problems. The primary care or specialist physician will see patients referred from the [facility] Emergency Department.  Maria G Boucher

## (undated) NOTE — LETTER
Date: 12/30/2021    Patient Name: Tammy Huynh          To Whom it may concern: This letter has been written at the patient's request. The above patient was seen at the [facility] for treatment of a medical condition.     This patient sh

## (undated) NOTE — ED AVS SNAPSHOT
[facility] Emergency Department    [address] [place] [address]    Phone:  [phone]    Fax:  [address]   MRN: [id number]    Department:  [facility] Emergency Department   Date of Visit:  1/23 Expect to receive an electronic request (by e-mail or text) to complete a self-assessment the day after your visit. You may also receive a call from our patient liason soon after your visit.  Also, some patients receive a detailed feedback survey mailed to [address] [phone] [address] ([address] [facility] [id number] [address] ([address]) [address] [place] [address] Call [phone] for help. [employer]hart is NOT to be used for urgent needs. For medical emergencies, dial 911.